# Patient Record
Sex: MALE | Race: WHITE | NOT HISPANIC OR LATINO | Employment: OTHER | RURAL
[De-identification: names, ages, dates, MRNs, and addresses within clinical notes are randomized per-mention and may not be internally consistent; named-entity substitution may affect disease eponyms.]

---

## 2020-07-27 ENCOUNTER — HISTORICAL (OUTPATIENT)
Dept: ADMINISTRATIVE | Facility: HOSPITAL | Age: 45
End: 2020-07-27

## 2020-07-28 LAB — GOLDENROD IGE QN: <0 KU/L

## 2021-05-31 ENCOUNTER — HOSPITAL ENCOUNTER (EMERGENCY)
Facility: HOSPITAL | Age: 46
Discharge: HOME | End: 2021-05-31
Attending: EMERGENCY MEDICINE
Payer: MEDICARE

## 2021-05-31 VITALS
RESPIRATION RATE: 16 BRPM | BODY MASS INDEX: 44.1 KG/M2 | HEART RATE: 65 BPM | WEIGHT: 315 LBS | HEIGHT: 71 IN | TEMPERATURE: 98 F | SYSTOLIC BLOOD PRESSURE: 153 MMHG | DIASTOLIC BLOOD PRESSURE: 87 MMHG | OXYGEN SATURATION: 95 %

## 2021-05-31 DIAGNOSIS — R10.11 RIGHT UPPER QUADRANT ABDOMINAL PAIN: Primary | ICD-10-CM

## 2021-05-31 LAB
ALBUMIN SERPL BCP-MCNC: 3.8 G/DL (ref 3.5–5)
ALBUMIN/GLOB SERPL: 1.2 {RATIO}
ALP SERPL-CCNC: 74 U/L (ref 45–115)
ALT SERPL W P-5'-P-CCNC: 30 U/L (ref 16–61)
ANION GAP SERPL CALCULATED.3IONS-SCNC: 11 MMOL/L (ref 7–16)
AST SERPL W P-5'-P-CCNC: 22 U/L (ref 15–37)
BASOPHILS # BLD AUTO: 0.08 K/UL (ref 0–0.2)
BASOPHILS NFR BLD AUTO: 1.4 % (ref 0–1)
BILIRUB SERPL-MCNC: 0.3 MG/DL (ref 0–1.2)
BILIRUB UR QL STRIP: NEGATIVE
BUN SERPL-MCNC: 12 MG/DL (ref 7–18)
BUN/CREAT SERPL: 11 (ref 6–20)
CALCIUM SERPL-MCNC: 8.7 MG/DL (ref 8.5–10.1)
CHLORIDE SERPL-SCNC: 106 MMOL/L (ref 98–107)
CLARITY UR: CLEAR
CO2 SERPL-SCNC: 31 MMOL/L (ref 21–32)
COLOR UR: NORMAL
CREAT SERPL-MCNC: 1.07 MG/DL (ref 0.7–1.3)
DIFFERENTIAL METHOD BLD: ABNORMAL
EOSINOPHIL # BLD AUTO: 0.54 K/UL (ref 0–0.5)
EOSINOPHIL NFR BLD AUTO: 9.5 % (ref 1–4)
ERYTHROCYTE [DISTWIDTH] IN BLOOD BY AUTOMATED COUNT: 13.3 % (ref 11.5–14.5)
GLOBULIN SER-MCNC: 3.3 G/DL (ref 2–4)
GLUCOSE SERPL-MCNC: 97 MG/DL (ref 74–106)
GLUCOSE UR STRIP-MCNC: NEGATIVE MG/DL
HCT VFR BLD AUTO: 43.7 % (ref 40–54)
HGB BLD-MCNC: 14.6 G/DL (ref 13.5–18)
IMM GRANULOCYTES # BLD AUTO: 0.01 K/UL (ref 0–0.04)
IMM GRANULOCYTES NFR BLD: 0.2 % (ref 0–0.4)
KETONES UR STRIP-SCNC: NEGATIVE MG/DL
LEUKOCYTE ESTERASE UR QL STRIP: NEGATIVE
LIPASE SERPL-CCNC: 113 U/L (ref 73–393)
LYMPHOCYTES # BLD AUTO: 1.67 K/UL (ref 1–4.8)
LYMPHOCYTES NFR BLD AUTO: 29.4 % (ref 27–41)
MCH RBC QN AUTO: 29.1 PG (ref 27–31)
MCHC RBC AUTO-ENTMCNC: 33.4 G/DL (ref 32–36)
MCV RBC AUTO: 87.2 FL (ref 80–96)
MONOCYTES # BLD AUTO: 0.6 K/UL (ref 0–0.8)
MONOCYTES NFR BLD AUTO: 10.6 % (ref 2–6)
MPC BLD CALC-MCNC: 9.4 FL (ref 9.4–12.4)
NEUTROPHILS # BLD AUTO: 2.78 K/UL (ref 1.8–7.7)
NEUTROPHILS NFR BLD AUTO: 48.9 % (ref 53–65)
NITRITE UR QL STRIP: NEGATIVE
NRBC # BLD AUTO: 0 X10E3/UL
NRBC, AUTO (.00): 0 %
PH UR STRIP: 7.5 PH UNITS
PLATELET # BLD AUTO: 188 K/UL (ref 150–400)
POTASSIUM SERPL-SCNC: 4.9 MMOL/L (ref 3.5–5.1)
PROT SERPL-MCNC: 7.1 G/DL (ref 6.4–8.2)
PROT UR QL STRIP: NEGATIVE
RBC # BLD AUTO: 5.01 M/UL (ref 4.6–6.2)
RBC # UR STRIP: NEGATIVE /UL
SODIUM SERPL-SCNC: 143 MMOL/L (ref 136–145)
SP GR UR STRIP: 1.01
UROBILINOGEN UR STRIP-ACNC: 0.2 MG/DL
WBC # BLD AUTO: 5.68 K/UL (ref 4.5–11)

## 2021-05-31 PROCEDURE — 99284 PR EMERGENCY DEPT VISIT,LEVEL IV: ICD-10-PCS | Mod: ,,, | Performed by: EMERGENCY MEDICINE

## 2021-05-31 PROCEDURE — 80053 COMPREHEN METABOLIC PANEL: CPT | Performed by: EMERGENCY MEDICINE

## 2021-05-31 PROCEDURE — 85025 COMPLETE CBC W/AUTO DIFF WBC: CPT | Performed by: EMERGENCY MEDICINE

## 2021-05-31 PROCEDURE — 96375 TX/PRO/DX INJ NEW DRUG ADDON: CPT

## 2021-05-31 PROCEDURE — 36415 COLL VENOUS BLD VENIPUNCTURE: CPT | Performed by: EMERGENCY MEDICINE

## 2021-05-31 PROCEDURE — 83690 ASSAY OF LIPASE: CPT | Performed by: EMERGENCY MEDICINE

## 2021-05-31 PROCEDURE — 63600175 PHARM REV CODE 636 W HCPCS: Performed by: EMERGENCY MEDICINE

## 2021-05-31 PROCEDURE — 81003 URINALYSIS AUTO W/O SCOPE: CPT | Performed by: EMERGENCY MEDICINE

## 2021-05-31 PROCEDURE — 99284 EMERGENCY DEPT VISIT MOD MDM: CPT | Mod: ,,, | Performed by: EMERGENCY MEDICINE

## 2021-05-31 PROCEDURE — 96374 THER/PROPH/DIAG INJ IV PUSH: CPT

## 2021-05-31 PROCEDURE — 96361 HYDRATE IV INFUSION ADD-ON: CPT

## 2021-05-31 PROCEDURE — 99285 EMERGENCY DEPT VISIT HI MDM: CPT | Mod: 25

## 2021-05-31 PROCEDURE — 25000003 PHARM REV CODE 250: Performed by: EMERGENCY MEDICINE

## 2021-05-31 RX ORDER — ONDANSETRON 2 MG/ML
4 INJECTION INTRAMUSCULAR; INTRAVENOUS
Status: COMPLETED | OUTPATIENT
Start: 2021-05-31 | End: 2021-05-31

## 2021-05-31 RX ORDER — HYDROCODONE BITARTRATE AND ACETAMINOPHEN 10; 325 MG/1; MG/1
1 TABLET ORAL 3 TIMES DAILY
COMMUNITY

## 2021-05-31 RX ORDER — AMOXICILLIN AND CLAVULANATE POTASSIUM 875; 125 MG/1; MG/1
1 TABLET, FILM COATED ORAL 2 TIMES DAILY
Qty: 20 TABLET | Refills: 0 | Status: SHIPPED | OUTPATIENT
Start: 2021-05-31 | End: 2021-06-10

## 2021-05-31 RX ORDER — HYDROMORPHONE HYDROCHLORIDE 2 MG/ML
1 INJECTION, SOLUTION INTRAMUSCULAR; INTRAVENOUS; SUBCUTANEOUS
Status: COMPLETED | OUTPATIENT
Start: 2021-05-31 | End: 2021-05-31

## 2021-05-31 RX ADMIN — SODIUM CHLORIDE 500 ML: 9 INJECTION, SOLUTION INTRAVENOUS at 04:05

## 2021-05-31 RX ADMIN — HYDROMORPHONE HYDROCHLORIDE 1 MG: 2 INJECTION, SOLUTION INTRAMUSCULAR; INTRAVENOUS; SUBCUTANEOUS at 04:05

## 2021-05-31 RX ADMIN — ONDANSETRON 4 MG: 2 INJECTION INTRAMUSCULAR; INTRAVENOUS at 04:05

## 2021-07-28 ENCOUNTER — INFUSION (OUTPATIENT)
Dept: INFECTIOUS DISEASES | Facility: HOSPITAL | Age: 46
End: 2021-07-28
Payer: MEDICARE

## 2021-07-28 VITALS
SYSTOLIC BLOOD PRESSURE: 143 MMHG | RESPIRATION RATE: 16 BRPM | HEART RATE: 60 BPM | TEMPERATURE: 98 F | DIASTOLIC BLOOD PRESSURE: 86 MMHG | OXYGEN SATURATION: 98 %

## 2021-07-28 DIAGNOSIS — U07.1 COVID-19: Primary | ICD-10-CM

## 2021-07-28 PROCEDURE — 25000003 PHARM REV CODE 250: Performed by: NURSE PRACTITIONER

## 2021-07-28 PROCEDURE — M0243 CASIRIVI AND IMDEVI INFUSION: HCPCS | Performed by: NURSE PRACTITIONER

## 2021-07-28 PROCEDURE — 63600175 PHARM REV CODE 636 W HCPCS: Performed by: NURSE PRACTITIONER

## 2021-07-28 RX ORDER — ONDANSETRON 4 MG/1
4 TABLET, ORALLY DISINTEGRATING ORAL ONCE AS NEEDED
Status: DISCONTINUED | OUTPATIENT
Start: 2021-07-28 | End: 2023-03-07 | Stop reason: CLARIF

## 2021-07-28 RX ORDER — DIPHENHYDRAMINE HYDROCHLORIDE 50 MG/ML
25 INJECTION INTRAMUSCULAR; INTRAVENOUS ONCE AS NEEDED
Status: DISCONTINUED | OUTPATIENT
Start: 2021-07-28 | End: 2023-03-07 | Stop reason: CLARIF

## 2021-07-28 RX ORDER — EPINEPHRINE 0.3 MG/.3ML
0.3 INJECTION SUBCUTANEOUS
Status: DISCONTINUED | OUTPATIENT
Start: 2021-07-28 | End: 2023-03-07 | Stop reason: CLARIF

## 2021-07-28 RX ORDER — ALBUTEROL SULFATE 90 UG/1
2 AEROSOL, METERED RESPIRATORY (INHALATION)
Status: DISCONTINUED | OUTPATIENT
Start: 2021-07-28 | End: 2023-03-07

## 2021-07-28 RX ORDER — SODIUM CHLORIDE 0.9 % (FLUSH) 0.9 %
10 SYRINGE (ML) INJECTION
Status: DISCONTINUED | OUTPATIENT
Start: 2021-07-28 | End: 2023-03-07 | Stop reason: CLARIF

## 2021-07-28 RX ORDER — ACETAMINOPHEN 325 MG/1
650 TABLET ORAL ONCE AS NEEDED
Status: DISCONTINUED | OUTPATIENT
Start: 2021-07-28 | End: 2023-03-07 | Stop reason: CLARIF

## 2021-07-28 RX ADMIN — CASIRIVIMAB AND IMDEVIMAB 600 MG: 600; 600 INJECTION, SOLUTION, CONCENTRATE INTRAVENOUS at 10:07

## 2021-07-29 ENCOUNTER — TELEPHONE (OUTPATIENT)
Dept: EMERGENCY MEDICINE | Facility: HOSPITAL | Age: 46
End: 2021-07-29

## 2022-07-08 ENCOUNTER — HOSPITAL ENCOUNTER (EMERGENCY)
Facility: HOSPITAL | Age: 47
Discharge: HOME | End: 2022-07-08
Payer: MEDICARE

## 2022-07-08 VITALS
SYSTOLIC BLOOD PRESSURE: 129 MMHG | WEIGHT: 315 LBS | OXYGEN SATURATION: 99 % | TEMPERATURE: 98 F | DIASTOLIC BLOOD PRESSURE: 69 MMHG | HEIGHT: 71 IN | RESPIRATION RATE: 20 BRPM | HEART RATE: 79 BPM | BODY MASS INDEX: 44.1 KG/M2

## 2022-07-08 DIAGNOSIS — J32.9 SINUSITIS, UNSPECIFIED CHRONICITY, UNSPECIFIED LOCATION: Primary | ICD-10-CM

## 2022-07-08 DIAGNOSIS — R07.9 CHEST PAIN: ICD-10-CM

## 2022-07-08 DIAGNOSIS — R10.9 ABDOMINAL PAIN: ICD-10-CM

## 2022-07-08 DIAGNOSIS — R10.9 ABDOMINAL PAIN, UNSPECIFIED ABDOMINAL LOCATION: ICD-10-CM

## 2022-07-08 LAB
ANION GAP SERPL CALCULATED.3IONS-SCNC: 11 MMOL/L (ref 7–16)
BASOPHILS # BLD AUTO: 0.08 K/UL (ref 0–0.2)
BASOPHILS NFR BLD AUTO: 0.8 % (ref 0–1)
BUN SERPL-MCNC: 19 MG/DL (ref 7–18)
BUN/CREAT SERPL: 16 (ref 6–20)
CALCIUM SERPL-MCNC: 8.7 MG/DL (ref 8.5–10.1)
CHLORIDE SERPL-SCNC: 104 MMOL/L (ref 98–107)
CK MB SERPL-MCNC: 1.4 NG/ML (ref 1–3.6)
CO2 SERPL-SCNC: 26 MMOL/L (ref 21–32)
CREAT SERPL-MCNC: 1.22 MG/DL (ref 0.7–1.3)
DIFFERENTIAL METHOD BLD: ABNORMAL
EOSINOPHIL # BLD AUTO: 0.18 K/UL (ref 0–0.5)
EOSINOPHIL NFR BLD AUTO: 1.9 % (ref 1–4)
ERYTHROCYTE [DISTWIDTH] IN BLOOD BY AUTOMATED COUNT: 13.4 % (ref 11.5–14.5)
GLUCOSE SERPL-MCNC: 88 MG/DL (ref 74–106)
HCT VFR BLD AUTO: 44.1 % (ref 40–54)
HGB BLD-MCNC: 15.1 G/DL (ref 13.5–18)
IMM GRANULOCYTES # BLD AUTO: 0.02 K/UL (ref 0–0.04)
IMM GRANULOCYTES NFR BLD: 0.2 % (ref 0–0.4)
LYMPHOCYTES # BLD AUTO: 1.63 K/UL (ref 1–4.8)
LYMPHOCYTES NFR BLD AUTO: 17 % (ref 27–41)
MAGNESIUM SERPL-MCNC: 2.2 MG/DL (ref 1.7–2.3)
MCH RBC QN AUTO: 29 PG (ref 27–31)
MCHC RBC AUTO-ENTMCNC: 34.2 G/DL (ref 32–36)
MCV RBC AUTO: 84.8 FL (ref 80–96)
MONOCYTES # BLD AUTO: 0.77 K/UL (ref 0–0.8)
MONOCYTES NFR BLD AUTO: 8 % (ref 2–6)
MPC BLD CALC-MCNC: 9.8 FL (ref 9.4–12.4)
MYOGLOBIN SERPL-MCNC: 51 NG/ML (ref 16–116)
NEUTROPHILS # BLD AUTO: 6.93 K/UL (ref 1.8–7.7)
NEUTROPHILS NFR BLD AUTO: 72.1 % (ref 53–65)
NRBC # BLD AUTO: 0 X10E3/UL
NRBC, AUTO (.00): 0 %
PLATELET # BLD AUTO: 217 K/UL (ref 150–400)
POTASSIUM SERPL-SCNC: 4.4 MMOL/L (ref 3.5–5.1)
RBC # BLD AUTO: 5.2 M/UL (ref 4.6–6.2)
SODIUM SERPL-SCNC: 137 MMOL/L (ref 136–145)
TROPONIN I SERPL HS-MCNC: 26.1 PG/ML
WBC # BLD AUTO: 9.61 K/UL (ref 4.5–11)

## 2022-07-08 PROCEDURE — 99285 EMERGENCY DEPT VISIT HI MDM: CPT | Mod: 25

## 2022-07-08 PROCEDURE — 93010 ELECTROCARDIOGRAM REPORT: CPT | Mod: ,,, | Performed by: STUDENT IN AN ORGANIZED HEALTH CARE EDUCATION/TRAINING PROGRAM

## 2022-07-08 PROCEDURE — 93005 ELECTROCARDIOGRAM TRACING: CPT

## 2022-07-08 PROCEDURE — 99284 PR EMERGENCY DEPT VISIT,LEVEL IV: ICD-10-PCS | Mod: ,,, | Performed by: NURSE PRACTITIONER

## 2022-07-08 PROCEDURE — 83735 ASSAY OF MAGNESIUM: CPT | Performed by: NURSE PRACTITIONER

## 2022-07-08 PROCEDURE — 93010 EKG 12-LEAD: ICD-10-PCS | Mod: ,,, | Performed by: STUDENT IN AN ORGANIZED HEALTH CARE EDUCATION/TRAINING PROGRAM

## 2022-07-08 PROCEDURE — 82553 CREATINE MB FRACTION: CPT | Performed by: NURSE PRACTITIONER

## 2022-07-08 PROCEDURE — 99284 EMERGENCY DEPT VISIT MOD MDM: CPT | Mod: ,,, | Performed by: NURSE PRACTITIONER

## 2022-07-08 PROCEDURE — 84484 ASSAY OF TROPONIN QUANT: CPT | Performed by: NURSE PRACTITIONER

## 2022-07-08 PROCEDURE — 85025 COMPLETE CBC W/AUTO DIFF WBC: CPT | Performed by: NURSE PRACTITIONER

## 2022-07-08 PROCEDURE — 83874 ASSAY OF MYOGLOBIN: CPT | Performed by: NURSE PRACTITIONER

## 2022-07-08 PROCEDURE — 80048 BASIC METABOLIC PNL TOTAL CA: CPT | Performed by: NURSE PRACTITIONER

## 2022-07-08 PROCEDURE — 36415 COLL VENOUS BLD VENIPUNCTURE: CPT | Performed by: NURSE PRACTITIONER

## 2022-07-08 RX ORDER — AMOXICILLIN AND CLAVULANATE POTASSIUM 875; 125 MG/1; MG/1
1 TABLET, FILM COATED ORAL EVERY 12 HOURS
Qty: 20 TABLET | Refills: 0 | Status: SHIPPED | OUTPATIENT
Start: 2022-07-08 | End: 2022-07-18

## 2022-07-08 NOTE — ED PROVIDER NOTES
Encounter Date: 7/8/2022       History     Chief Complaint   Patient presents with    Abdominal Pain     Pt presents with increased blurred vision and epigastric pain that started around 1pm today. Pt states he has seen an eye doctor and PCP for almost a year for intermittent blurred vision but it worsened today. Pt states he got dizzy and increased blurred vision and stumbled around 1pm today. Pt ambulated to the room without problems and appears in no distress at this time. He states his vision is normal now and mild epigastric pain. Pt denies fever, cough, nausea, palpitations.         Review of patient's allergies indicates:   Allergen Reactions    Opioids - morphine analogues Hives and Rash     Past Medical History:   Diagnosis Date    Asthma     Diabetes mellitus, type 2     Hypertension      No past surgical history on file.  No family history on file.  Social History     Tobacco Use    Smoking status: Never Smoker    Smokeless tobacco: Never Used   Substance Use Topics    Alcohol use: Not Currently    Drug use: Never     Review of Systems   Constitutional: Negative for fever.   HENT: Negative for sore throat.    Eyes: Positive for visual disturbance.   Respiratory: Negative for shortness of breath.    Cardiovascular: Negative for chest pain.   Gastrointestinal: Positive for abdominal pain. Negative for nausea.   Genitourinary: Negative for dysuria.   Musculoskeletal: Negative for back pain.   Skin: Negative for rash.   Neurological: Negative for dizziness and weakness.   Hematological: Does not bruise/bleed easily.       Physical Exam     Initial Vitals [07/08/22 1511]   BP Pulse Resp Temp SpO2   132/71 78 20 97.8 °F (36.6 °C) 96 %      MAP       --         Physical Exam    Nursing note and vitals reviewed.  Constitutional: He appears well-developed.   Cardiovascular: Normal rate and regular rhythm.   Pulmonary/Chest: Breath sounds normal.   Abdominal: Abdomen is soft. Bowel sounds are normal.    Musculoskeletal:         General: Normal range of motion.     Neurological: He is alert and oriented to person, place, and time. He has normal strength. No cranial nerve deficit or sensory deficit.   Psychiatric: He has a normal mood and affect. Thought content normal.         Medical Screening Exam   See Full Note    ED Course   Procedures  Labs Reviewed   BASIC METABOLIC PANEL - Abnormal; Notable for the following components:       Result Value    BUN 19 (*)     All other components within normal limits   CBC WITH DIFFERENTIAL - Abnormal; Notable for the following components:    Neutrophils % 72.1 (*)     Lymphocytes % 17.0 (*)     Monocytes % 8.0 (*)     All other components within normal limits   MAGNESIUM - Normal   TROPONIN I - Normal   CK-MB - Normal   MYOGLOBIN, SERUM - Normal   CBC W/ AUTO DIFFERENTIAL    Narrative:     The following orders were created for panel order CBC auto differential.  Procedure                               Abnormality         Status                     ---------                               -----------         ------                     CBC with Differential[[id number]]        Abnormal            Final result                 Please view results for these tests on the individual orders.   EXTRA TUBES    Narrative:     The following orders were created for panel order EXTRA TUBES.  Procedure                               Abnormality         Status                     ---------                               -----------         ------                     Light Blue Top Hold[[id number]]                              In process                   Please view results for these tests on the individual orders.   LIGHT BLUE TOP HOLD        ECG Results          EKG 12-lead (Final result)  Result time 07/08/22 16:20:27    Final result by Interface, Lab In [facility] (07/08/22 16:20:27)                 Narrative:    Test Reason : R07.9,    Vent. Rate : 075 BPM     Atrial Rate : 075 BPM     P-R Int  : 150 ms          QRS Dur : 092 ms      QT Int : 390 ms       P-R-T Axes : 048 -14 040 degrees     QTc Int : 435 ms    Normal sinus rhythm  Normal ECG  No previous ECGs available  Confirmed by Anel KOO, Gilbert DORSEY ([id number]) on 7/8/2022 4:20:20 PM    Referred By: SURINDER   SELF           Confirmed By:Gilbert Tam MD                            Imaging Results          X-Ray Chest PA And Lateral (Final result)  Result time 07/08/22 16:45:51    Final result by Edy Ayers DO (07/08/22 16:45:51)                 Impression:      No acute cardiopulmonary process demonstrated.    Point of Service: [facility]      Electronically signed by: Edy Ayers  Date:    07/08/2022  Time:    16:45             Narrative:    EXAMINATION:  XR CHEST PA AND LATERAL    CLINICAL HISTORY:  Chest pain, unspecified    COMPARISON:  Chest x-ray April 18, 2018    TECHNIQUE:  Frontal and lateral views of the chest.    FINDINGS:  The cardiomediastinal silhouette is stable in configuration.  Chronic change of the lungs without focal consolidation, pleural effusion, or pneumothorax.  Visualized osseous and surrounding soft tissue structures appear grossly unchanged.  Partially visualized lower cervical fusion hardware.                               CT Head Without Contrast (Final result)  Result time 07/08/22 16:39:56    Final result by Edy Ayers DO (07/08/22 16:39:56)                 Impression:      No convincing imaging evidence of acute intracranial abnormality.  Left ethmoid sinus disease.    The CT exam was performed using one or more of the following dose    reduction techniques- Automated exposure control, adjustment of the mA    and/or kV according to patient size, and/or use of iterative    reconstructed technique.    Point of Service: [facility]      Electronically signed by: Edy Ayers  Date:    07/08/2022  Time:    16:39             Narrative:    EXAMINATION:  CT HEAD WITHOUT  CONTRAST    CLINICAL HISTORY:  blurred vision, dizziness;    COMPARISON:  None    TECHNIQUE:  Multiple axial tomographic images of the brain were obtained without the use of intravenous contrast.    FINDINGS:  Midline structures are nondisplaced.  No convincing evidence of acute intracranial hemorrhage.  No convincing evidence of hydrocephalus.  Opacification of a few left posterior ethmoid air cells.                                 Medications - No data to display                    Clinical Impression:   Final diagnoses:  [R07.9] Chest pain  [R10.9] Abdominal pain  [J32.9] Sinusitis, unspecified chronicity, unspecified location (Primary)          ED Disposition Condition    Discharge Stable        ED Prescriptions     Medication Sig Dispense Start Date End Date Auth. Provider    amoxicillin-clavulanate 875-125mg (AUGMENTIN) 875-125 mg per tablet Take 1 tablet by mouth every 12 (twelve) hours. for 10 days 20 tablet 7/8/2022 7/18/2022 SAMEER Mcnulty        Follow-up Information    None          SAMEER Mcnulty  07/08/22 0631

## 2022-07-08 NOTE — ED TRIAGE NOTES
PATIENT PRESENTS TO ER WITH COMPLAINT OF BLURRY VISION X 7-9 MONTHS. HAS BEEN EVALUATED AT HIS PCP FOR THIS AND NO RESULTS. ALSO REPORTS UPPER ABD AND EPIGASTRIC PAIN

## 2022-11-08 ENCOUNTER — HOSPITAL ENCOUNTER (EMERGENCY)
Facility: HOSPITAL | Age: 47
Discharge: HOME | End: 2022-11-08
Payer: MEDICARE

## 2022-11-08 VITALS
RESPIRATION RATE: 18 BRPM | BODY MASS INDEX: 44.1 KG/M2 | OXYGEN SATURATION: 99 % | TEMPERATURE: 98 F | HEART RATE: 64 BPM | WEIGHT: 315 LBS | DIASTOLIC BLOOD PRESSURE: 97 MMHG | SYSTOLIC BLOOD PRESSURE: 159 MMHG | HEIGHT: 71 IN

## 2022-11-08 DIAGNOSIS — R11.0 NAUSEA: ICD-10-CM

## 2022-11-08 DIAGNOSIS — R10.9 ABDOMINAL PAIN, UNSPECIFIED ABDOMINAL LOCATION: Primary | ICD-10-CM

## 2022-11-08 LAB
ALBUMIN SERPL BCP-MCNC: 3.5 G/DL (ref 3.5–5)
ALBUMIN/GLOB SERPL: 1.4 {RATIO}
ALP SERPL-CCNC: 60 U/L (ref 45–115)
ALT SERPL W P-5'-P-CCNC: 27 U/L (ref 16–61)
ANION GAP SERPL CALCULATED.3IONS-SCNC: 10 MMOL/L (ref 7–16)
AST SERPL W P-5'-P-CCNC: 22 U/L (ref 15–37)
BASOPHILS # BLD AUTO: 0.07 K/UL (ref 0–0.2)
BASOPHILS NFR BLD AUTO: 1.2 % (ref 0–1)
BILIRUB SERPL-MCNC: 0.4 MG/DL (ref ?–1.2)
BUN SERPL-MCNC: 11 MG/DL (ref 7–18)
BUN/CREAT SERPL: 10 (ref 6–20)
CALCIUM SERPL-MCNC: 8.3 MG/DL (ref 8.5–10.1)
CHLORIDE SERPL-SCNC: 106 MMOL/L (ref 98–107)
CO2 SERPL-SCNC: 29 MMOL/L (ref 21–32)
CREAT SERPL-MCNC: 1.06 MG/DL (ref 0.7–1.3)
DIFFERENTIAL METHOD BLD: ABNORMAL
EGFR (NO RACE VARIABLE) (RUSH/TITUS): 87 ML/MIN/1.73M²
EOSINOPHIL # BLD AUTO: 0.38 K/UL (ref 0–0.5)
EOSINOPHIL NFR BLD AUTO: 6.4 % (ref 1–4)
ERYTHROCYTE [DISTWIDTH] IN BLOOD BY AUTOMATED COUNT: 13.3 % (ref 11.5–14.5)
GLOBULIN SER-MCNC: 2.5 G/DL (ref 2–4)
GLUCOSE SERPL-MCNC: 116 MG/DL (ref 70–105)
GLUCOSE SERPL-MCNC: 93 MG/DL (ref 74–106)
HCT VFR BLD AUTO: 43.1 % (ref 40–54)
HGB BLD-MCNC: 14.2 G/DL (ref 13.5–18)
IMM GRANULOCYTES # BLD AUTO: 0.01 K/UL (ref 0–0.04)
IMM GRANULOCYTES NFR BLD: 0.2 % (ref 0–0.4)
LIPASE SERPL-CCNC: 71 U/L (ref 73–393)
LYMPHOCYTES # BLD AUTO: 1.68 K/UL (ref 1–4.8)
LYMPHOCYTES NFR BLD AUTO: 28.5 % (ref 27–41)
MCH RBC QN AUTO: 28.7 PG (ref 27–31)
MCHC RBC AUTO-ENTMCNC: 32.9 G/DL (ref 32–36)
MCV RBC AUTO: 87.2 FL (ref 80–96)
MONOCYTES # BLD AUTO: 0.57 K/UL (ref 0–0.8)
MONOCYTES NFR BLD AUTO: 9.7 % (ref 2–6)
MPC BLD CALC-MCNC: 9.3 FL (ref 9.4–12.4)
NEUTROPHILS # BLD AUTO: 3.19 K/UL (ref 1.8–7.7)
NEUTROPHILS NFR BLD AUTO: 54 % (ref 53–65)
NRBC # BLD AUTO: 0 X10E3/UL
NRBC, AUTO (.00): 0 %
PLATELET # BLD AUTO: 198 K/UL (ref 150–400)
POTASSIUM SERPL-SCNC: 4.5 MMOL/L (ref 3.5–5.1)
PROT SERPL-MCNC: 6 G/DL (ref 6.4–8.2)
RBC # BLD AUTO: 4.94 M/UL (ref 4.6–6.2)
SODIUM SERPL-SCNC: 140 MMOL/L (ref 136–145)
WBC # BLD AUTO: 5.9 K/UL (ref 4.5–11)

## 2022-11-08 PROCEDURE — 80053 COMPREHEN METABOLIC PANEL: CPT | Performed by: NURSE PRACTITIONER

## 2022-11-08 PROCEDURE — 83690 ASSAY OF LIPASE: CPT | Performed by: NURSE PRACTITIONER

## 2022-11-08 PROCEDURE — 99283 EMERGENCY DEPT VISIT LOW MDM: CPT | Mod: ,,, | Performed by: NURSE PRACTITIONER

## 2022-11-08 PROCEDURE — 36415 COLL VENOUS BLD VENIPUNCTURE: CPT | Performed by: NURSE PRACTITIONER

## 2022-11-08 PROCEDURE — 85025 COMPLETE CBC W/AUTO DIFF WBC: CPT | Performed by: NURSE PRACTITIONER

## 2022-11-08 PROCEDURE — 82962 GLUCOSE BLOOD TEST: CPT

## 2022-11-08 PROCEDURE — 25500020 PHARM REV CODE 255: Performed by: NURSE PRACTITIONER

## 2022-11-08 PROCEDURE — 99283 PR EMERGENCY DEPT VISIT,LEVEL III: ICD-10-PCS | Mod: ,,, | Performed by: NURSE PRACTITIONER

## 2022-11-08 PROCEDURE — 99285 EMERGENCY DEPT VISIT HI MDM: CPT | Mod: 25

## 2022-11-08 RX ORDER — ONDANSETRON 4 MG/1
4 TABLET, ORALLY DISINTEGRATING ORAL EVERY 8 HOURS PRN
Qty: 10 TABLET | Refills: 0 | Status: SHIPPED | OUTPATIENT
Start: 2022-11-08 | End: 2023-06-21 | Stop reason: ALTCHOICE

## 2022-11-08 RX ADMIN — IOPAMIDOL 100 ML: 755 INJECTION, SOLUTION INTRAVENOUS at 01:11

## 2022-11-08 NOTE — ED TRIAGE NOTES
Pt presents to the ED with c/o abdominal pain that started Friday. States hx of 5-7 ulcers in the past that were treated with abx and resolved. C/o nausea

## 2022-11-08 NOTE — ED PROVIDER NOTES
Encounter Date: 11/8/2022       History     Chief Complaint   Patient presents with    Abdominal Pain     47 year old male presents to the emergency department to be evaluated for pain in his right upper abdomen. His pain began 4 days ago. He has had some nausea. Denies any vomiting, diarrhea, constipation.     The history is provided by the patient.   Abdominal Pain  The current episode started several days ago. The abdominal pain radiates to the RUQ. The other symptoms of the illness include nausea. The other symptoms of the illness do not include fever, fatigue, jaundice, melena, vomiting, diarrhea, dysuria, hematemesis or hematochezia.   Review of patient's allergies indicates:   Allergen Reactions    Opioids - morphine analogues Hives and Rash     Past Medical History:   Diagnosis Date    Asthma     Diabetes mellitus, type 2     Hypertension      No past surgical history on file.  No family history on file.  Social History     Tobacco Use    Smoking status: Never    Smokeless tobacco: Never   Substance Use Topics    Alcohol use: Not Currently    Drug use: Never     Review of Systems   Constitutional:  Negative for fatigue and fever.   Gastrointestinal:  Positive for abdominal pain and nausea. Negative for diarrhea, hematemesis, hematochezia, jaundice, melena and vomiting.   Genitourinary:  Negative for dysuria.   All other systems reviewed and are negative.    Physical Exam     Initial Vitals   BP Pulse Resp Temp SpO2   11/08/22 0938 11/08/22 0938 11/08/22 0938 11/08/22 0938 11/08/22 1000   135/76 62 15 98.4 °F (36.9 °C) 96 %      MAP       --                Physical Exam    Vitals reviewed.  Constitutional: He appears well-developed and well-nourished.   Cardiovascular:  Normal rate and regular rhythm.           Pulmonary/Chest: Breath sounds normal.   Abdominal: Abdomen is soft. Bowel sounds are normal. He exhibits no distension and no mass. There is abdominal tenderness (moderate tenderness right upper  abdomen). There is no rebound and no guarding.   Musculoskeletal:         General: Normal range of motion.     Neurological: He is alert and oriented to person, place, and time. He has normal strength. GCS score is 15. GCS eye subscore is 4. GCS verbal subscore is 5. GCS motor subscore is 6.   Skin: Skin is warm and dry. Capillary refill takes less than 2 seconds.   Psychiatric: He has a normal mood and affect.       Medical Screening Exam   See Full Note    ED Course   Procedures  Labs Reviewed   COMPREHENSIVE METABOLIC PANEL - Abnormal; Notable for the following components:       Result Value    Calcium 8.3 (*)     Total Protein 6.0 (*)     All other components within normal limits   LIPASE - Abnormal; Notable for the following components:    Lipase 71 (*)     All other components within normal limits   CBC WITH DIFFERENTIAL - Abnormal; Notable for the following components:    MPV 9.3 (*)     Monocytes % 9.7 (*)     Eosinophils % 6.4 (*)     Basophils % 1.2 (*)     All other components within normal limits   POCT GLUCOSE MONITORING CONTINUOUS - Abnormal; Notable for the following components:    POC Glucose 116 (*)     All other components within normal limits   CBC W/ AUTO DIFFERENTIAL    Narrative:     The following orders were created for panel order CBC auto differential.  Procedure                               Abnormality         Status                     ---------                               -----------         ------                     CBC with Differential[[id number]]        Abnormal            Final result                 Please view results for these tests on the individual orders.          Imaging Results              CT Abdomen Pelvis With Contrast (Final result)  Result time 11/08/22 13:35:32      Final result by Leroy Cain II, MD (11/08/22 13:35:32)                   Impression:      No evidence of acute process or other significant abnormality demonstrated.      Electronically signed  by: Leroy Cain  Date:    11/08/2022  Time:    13:35               Narrative:    EXAMINATION:  CT ABDOMEN PELVIS WITH CONTRAST    CLINICAL HISTORY:  right upper abdominal pain;    TECHNIQUE:  Axial CT imaging of the abdomen and pelvis is performed with intravenous contrast. Contrast dose is 100 cc Isovue 370.    CT dose reduction technique used - Dose Rite and tube current modulation.    COMPARISON:  16 September 2016    FINDINGS:  Cardiac and lung bases are within normal limits    CT abdomen: The liver, spleen, pancreas and adrenal glands are normal in size and enhancement.  No evidence of focal lesion is demonstrated in these solid organs.    Kidneys are normal in size and enhancement.  No evidence of hydronephrosis or nephrolithiasis is seen.    The bowel caliber is normal and no wall thickening or adjacent inflammatory change is seen.  No evidence of free fluid or free air is present.  Appendix appears normal.    CT pelvis: The pelvic bowel appears within normal limits.  Bladder shows no evidence of abnormality.  The pelvic organs show no evidence of abnormality.                                       US Abdomen Limited_Gallbladder (Final result)  Result time 11/08/22 12:28:59      Final result by Leroy Cain II, MD (11/08/22 12:28:59)                   Impression:      Fatty liver infiltration and mild hepatomegaly.  No other evidence of abnormality demonstrated.    Ultrasound images stored and captured.      Electronically signed by: Leroy Cain  Date:    11/08/2022  Time:    12:28               Narrative:    EXAMINATION:  US ABDOMEN LIMITED_GALLBLADDER    CLINICAL HISTORY:  right upper abdominal pain;    TECHNIQUE:  Grayscale and color Doppler imaging performed.    COMPARISON:  None.    FINDINGS:  The liver is increased in size and echogenicity .  The gallbladder is fluid-filled without evidence of stones or sludge. The gallbladder wall thickness is 2.5 mm . The common bile duct measures 4.0  mm.    The visualized portion of the pancreas appear within normal limits    The right kidney is normal in size and echogenicity and measures 11.7 cm .    No free fluid or free air seen.                                       XR ABDOMEN, ACUTE 2 OR MORE VIEWS WITH CHEST (Final result)  Result time 11/08/22 10:48:35      Final result by Leroy Cain II, MD (11/08/22 10:48:35)                   Impression:      No evidence of abnormality demonstrated      Electronically signed by: Leroy Cain  Date:    11/08/2022  Time:    10:48               Narrative:    EXAMINATION:  XR ABDOMEN ACUTE 2 OR MORE VIEWS WITH CHEST    CLINICAL HISTORY:  Abdominal pain right upper abdominal pain;    COMPARISON:  28 September 2017    FINDINGS:  No free fluid or free air seen.  The bowel gas pattern appears within normal limits.  No abnormal calcifications are present. Chest shows no evidence of abnormality. No other abnormality is identified.                                       Medications   iopamidoL (ISOVUE-370) injection 100 mL (100 mLs Intravenous Given 11/8/22 1310)                       Clinical Impression:   Final diagnoses:  [R10.9] Abdominal pain, unspecified abdominal location (Primary)  [R11.0] Nausea      ED Disposition Condition    Discharge Stable          ED Prescriptions       Medication Sig Dispense Start Date End Date Auth. Provider    ondansetron (ZOFRAN-ODT) 4 MG TbDL Take 1 tablet (4 mg total) by mouth every 8 (eight) hours as needed. 10 tablet 11/8/2022 -- SAMEER Deal          Follow-up Information    None          SAMEER Deal  11/08/22 3667

## 2022-11-09 ENCOUNTER — TELEPHONE (OUTPATIENT)
Dept: EMERGENCY MEDICINE | Facility: HOSPITAL | Age: 47
End: 2022-11-09
Payer: MEDICARE

## 2023-03-07 ENCOUNTER — HOSPITAL ENCOUNTER (EMERGENCY)
Facility: HOSPITAL | Age: 48
Discharge: HOME | End: 2023-03-07
Payer: MEDICARE

## 2023-03-07 VITALS
OXYGEN SATURATION: 95 % | RESPIRATION RATE: 14 BRPM | WEIGHT: 315 LBS | HEIGHT: 71 IN | BODY MASS INDEX: 44.1 KG/M2 | HEART RATE: 53 BPM | DIASTOLIC BLOOD PRESSURE: 76 MMHG | SYSTOLIC BLOOD PRESSURE: 148 MMHG | TEMPERATURE: 99 F

## 2023-03-07 DIAGNOSIS — J10.1 INFLUENZA A: Primary | ICD-10-CM

## 2023-03-07 DIAGNOSIS — R06.02 SHORTNESS OF BREATH: ICD-10-CM

## 2023-03-07 DIAGNOSIS — J40 BRONCHITIS: ICD-10-CM

## 2023-03-07 LAB
ALBUMIN SERPL BCP-MCNC: 3.5 G/DL (ref 3.5–5)
ALBUMIN/GLOB SERPL: 1 {RATIO}
ALP SERPL-CCNC: 73 U/L (ref 45–115)
ALT SERPL W P-5'-P-CCNC: 34 U/L (ref 16–61)
ANION GAP SERPL CALCULATED.3IONS-SCNC: 9 MMOL/L (ref 7–16)
AST SERPL W P-5'-P-CCNC: 23 U/L (ref 15–37)
BASOPHILS # BLD AUTO: 0.02 K/UL (ref 0–0.2)
BASOPHILS NFR BLD AUTO: 0.3 % (ref 0–1)
BILIRUB SERPL-MCNC: 0.2 MG/DL (ref ?–1.2)
BUN SERPL-MCNC: 12 MG/DL (ref 7–18)
BUN/CREAT SERPL: 12 (ref 6–20)
CALCIUM SERPL-MCNC: 8.6 MG/DL (ref 8.5–10.1)
CHLORIDE SERPL-SCNC: 104 MMOL/L (ref 98–107)
CO2 SERPL-SCNC: 31 MMOL/L (ref 21–32)
CREAT SERPL-MCNC: 1.02 MG/DL (ref 0.7–1.3)
DIFFERENTIAL METHOD BLD: ABNORMAL
EGFR (NO RACE VARIABLE) (RUSH/TITUS): 91 ML/MIN/1.73M²
EOSINOPHIL # BLD AUTO: 0 K/UL (ref 0–0.5)
EOSINOPHIL NFR BLD AUTO: 0 % (ref 1–4)
ERYTHROCYTE [DISTWIDTH] IN BLOOD BY AUTOMATED COUNT: 14.8 % (ref 11.5–14.5)
FLUAV AG UPPER RESP QL IA.RAPID: POSITIVE
FLUBV AG UPPER RESP QL IA.RAPID: NEGATIVE
GLOBULIN SER-MCNC: 3.6 G/DL (ref 2–4)
GLUCOSE SERPL-MCNC: 111 MG/DL (ref 74–106)
HCT VFR BLD AUTO: 39.9 % (ref 40–54)
HGB BLD-MCNC: 13.5 G/DL (ref 13.5–18)
LYMPHOCYTES # BLD AUTO: 0.8 K/UL (ref 1–4.8)
LYMPHOCYTES NFR BLD AUTO: 11.4 % (ref 27–41)
MCH RBC QN AUTO: 29.6 PG (ref 27–31)
MCHC RBC AUTO-ENTMCNC: 33.8 G/DL (ref 32–36)
MCV RBC AUTO: 87.5 FL (ref 80–96)
MONOCYTES # BLD AUTO: 0.75 K/UL (ref 0–0.8)
MONOCYTES NFR BLD AUTO: 10.7 % (ref 2–6)
MPC BLD CALC-MCNC: 9.8 FL (ref 9.4–12.4)
NEUTROPHILS # BLD AUTO: 5.43 K/UL (ref 1.8–7.7)
NEUTROPHILS NFR BLD AUTO: 77.6 % (ref 53–65)
NT-PROBNP SERPL-MCNC: 460 PG/ML (ref 1–125)
PLATELET # BLD AUTO: 185 K/UL (ref 150–400)
POTASSIUM SERPL-SCNC: 4.4 MMOL/L (ref 3.5–5.1)
PROT SERPL-MCNC: 7.1 G/DL (ref 6.4–8.2)
RBC # BLD AUTO: 4.56 M/UL (ref 4.6–6.2)
SARS-COV+SARS-COV-2 AG RESP QL IA.RAPID: NEGATIVE
SODIUM SERPL-SCNC: 140 MMOL/L (ref 136–145)
TROPONIN I SERPL HS-MCNC: 9.3 PG/ML
WBC # BLD AUTO: 7 K/UL (ref 4.5–11)

## 2023-03-07 PROCEDURE — 99284 PR EMERGENCY DEPT VISIT,LEVEL IV: ICD-10-PCS | Mod: EDII,,, | Performed by: NURSE PRACTITIONER

## 2023-03-07 PROCEDURE — 87428 SARSCOV & INF VIR A&B AG IA: CPT | Performed by: NURSE PRACTITIONER

## 2023-03-07 PROCEDURE — 93005 ELECTROCARDIOGRAM TRACING: CPT

## 2023-03-07 PROCEDURE — 99284 EMERGENCY DEPT VISIT MOD MDM: CPT | Mod: EDII,,, | Performed by: NURSE PRACTITIONER

## 2023-03-07 PROCEDURE — 99285 EMERGENCY DEPT VISIT HI MDM: CPT | Mod: 25

## 2023-03-07 PROCEDURE — 80053 COMPREHEN METABOLIC PANEL: CPT | Performed by: NURSE PRACTITIONER

## 2023-03-07 PROCEDURE — 99284 EMERGENCY DEPT VISIT MOD MDM: CPT | Mod: GF

## 2023-03-07 PROCEDURE — 96374 THER/PROPH/DIAG INJ IV PUSH: CPT

## 2023-03-07 PROCEDURE — 83880 ASSAY OF NATRIURETIC PEPTIDE: CPT | Performed by: NURSE PRACTITIONER

## 2023-03-07 PROCEDURE — 84484 ASSAY OF TROPONIN QUANT: CPT | Performed by: NURSE PRACTITIONER

## 2023-03-07 PROCEDURE — 63600175 PHARM REV CODE 636 W HCPCS: Performed by: NURSE PRACTITIONER

## 2023-03-07 PROCEDURE — 85025 COMPLETE CBC W/AUTO DIFF WBC: CPT | Performed by: NURSE PRACTITIONER

## 2023-03-07 RX ORDER — ALBUTEROL SULFATE 90 UG/1
1-2 AEROSOL, METERED RESPIRATORY (INHALATION) EVERY 4 HOURS PRN
Qty: 18 G | Refills: 0 | Status: SHIPPED | OUTPATIENT
Start: 2023-03-07 | End: 2023-09-25

## 2023-03-07 RX ORDER — GABAPENTIN 300 MG/1
300 CAPSULE ORAL NIGHTLY
COMMUNITY

## 2023-03-07 RX ORDER — AZITHROMYCIN 250 MG/1
250 TABLET, FILM COATED ORAL DAILY
Qty: 6 TABLET | Refills: 0 | Status: SHIPPED | OUTPATIENT
Start: 2023-03-07 | End: 2023-06-16 | Stop reason: CLARIF

## 2023-03-07 RX ORDER — PREDNISONE 20 MG/1
40 TABLET ORAL DAILY
Qty: 10 TABLET | Refills: 0 | Status: SHIPPED | OUTPATIENT
Start: 2023-03-07 | End: 2023-03-12

## 2023-03-07 RX ORDER — OMEPRAZOLE 40 MG/1
40 CAPSULE, DELAYED RELEASE ORAL
COMMUNITY
End: 2023-09-25

## 2023-03-07 RX ORDER — METHYLPREDNISOLONE SOD SUCC 125 MG
125 VIAL (EA) INJECTION
Status: COMPLETED | OUTPATIENT
Start: 2023-03-07 | End: 2023-03-07

## 2023-03-07 RX ADMIN — METHYLPREDNISOLONE SODIUM SUCCINATE 125 MG: 125 INJECTION, POWDER, FOR SOLUTION INTRAMUSCULAR; INTRAVENOUS at 10:03

## 2023-03-07 NOTE — ED PROVIDER NOTES
Encounter Date: 3/7/2023       History     Chief Complaint   Patient presents with    Shortness of Breath    Cough    Chest Pain    Sore Throat     Stated Saturday, spitting up some blood     Patient presents to the ED with complaints of shortness of breath that has been ongoing for the past 5 days. Reports he has noticed some fever with his cough and congestion. Patient reports there are other family members sick also. Patient states he tried to go to another ED and clinic but was unable to be seen. Patient reports he made an appointment with his PCP today but did not feel he could wait.     The history is provided by the patient.   Review of patient's allergies indicates:   Allergen Reactions    Opioids - morphine analogues Hives and Rash     Past Medical History:   Diagnosis Date    Asthma     Diabetes mellitus, type 2     Hypertension      History reviewed. No pertinent surgical history.  History reviewed. No pertinent family history.  Social History     Tobacco Use    Smoking status: Never    Smokeless tobacco: Never   Substance Use Topics    Alcohol use: Not Currently    Drug use: Never     Review of Systems   Constitutional:  Positive for fever.   HENT:  Positive for congestion.    Respiratory:  Positive for cough and shortness of breath.    Cardiovascular: Negative.    Musculoskeletal: Negative.    Skin: Negative.    Neurological: Negative.    Psychiatric/Behavioral: Negative.     All other systems reviewed and are negative.    Physical Exam     Initial Vitals   BP Pulse Resp Temp SpO2   03/07/23 0940 03/07/23 0944 03/07/23 0944 03/07/23 0947 03/07/23 0940   (!) 150/73 (!) 54 (!) 22 98.5 °F (36.9 °C) 97 %      MAP       --                Physical Exam    Vitals reviewed.  Constitutional: He appears well-developed and well-nourished.   HENT:   Head: Normocephalic and atraumatic.   Right Ear: External ear normal.   Left Ear: External ear normal.   Nose: Nose normal.   Mouth/Throat: Oropharynx is clear and  moist.   Eyes: EOM are normal. Pupils are equal, round, and reactive to light.   Cardiovascular:  Normal rate, regular rhythm, normal heart sounds and intact distal pulses.           Pulmonary/Chest: He has wheezes.   Musculoskeletal:         General: Normal range of motion.     Neurological: He is alert and oriented to person, place, and time. He has normal strength. GCS score is 15. GCS eye subscore is 4. GCS verbal subscore is 5. GCS motor subscore is 6.   Skin: Skin is warm and dry. Capillary refill takes less than 2 seconds.   Psychiatric: He has a normal mood and affect. His behavior is normal. Judgment and thought content normal.       Medical Screening Exam   See Full Note    ED Course   Procedures  Labs Reviewed   COMPREHENSIVE METABOLIC PANEL - Abnormal; Notable for the following components:       Result Value    Glucose 111 (*)     All other components within normal limits   SARS-COV2 (COVID) W/ FLU ANTIGEN - Abnormal; Notable for the following components:    Influenza A Positive (*)     All other components within normal limits    Narrative:     Negative SARS-CoV results should not be used as the sole basis for treatment or patient management decisions; negative results should be considered in the context of a patient's recent exposures, history and the presene of clinical signs and symptoms consistent with COVID-19.  Negative results should be treated as presumptive and confirmed by molecular assay, if necessary for patient management.    Notification of POSITIVE INFLUENZA A ANTIGEN made to [facility]VON HOWELL RN VERIFIED ABD CONFIRMED RECEIPT   NT-PRO NATRIURETIC PEPTIDE - Abnormal; Notable for the following components:    ProBNP 460 (*)     All other components within normal limits   CBC WITH DIFFERENTIAL - Abnormal; Notable for the following components:    RBC 4.56 (*)     Hematocrit 39.9 (*)     RDW 14.8 (*)     Neutrophils % 77.6 (*)     Lymphocytes % 11.4 (*)     Lymphocytes,  Absolute 0.80 (*)     Monocytes % 10.7 (*)     Eosinophils % 0.0 (*)     All other components within normal limits   TROPONIN I - Normal   CBC W/ AUTO DIFFERENTIAL    Narrative:     The following orders were created for panel order CBC Auto Differential.  Procedure                               Abnormality         Status                     ---------                               -----------         ------                     CBC with Differential[[id number]]        Abnormal            Final result                 Please view results for these tests on the individual orders.          Imaging Results              X-Ray Chest 1 View (Final result)  Result time 03/07/23 09:58:44      Final result by David Ramos DO (03/07/23 09:58:44)                   Impression:      No acute pulmonary disease      Electronically signed by: David Ramos  Date:    03/07/2023  Time:    09:58               Narrative:    EXAMINATION:  XR CHEST 1 VIEW    CLINICAL HISTORY:  shortness of breath;    TECHNIQUE:  XR CHEST 1 VIEW    COMPARISON:  2018    FINDINGS:  No lines or tubes.    Lungs are clear.    Normal pleura.    Cardiac silhouette is similar to comparison exam.    No obvious acute bone findings.                                       Medications   methylPREDNISolone sodium succinate injection 125 mg (125 mg Intravenous Given 3/7/23 1018)     Medical Decision Making:   Clinical Tests:   Lab Tests: Ordered and Reviewed  The following lab test(s) were unremarkable: CBC, CMP, BNP and Troponin       <> Summary of Lab: Influenza A positive  ED Management:  MDM    Patient presents for emergent evaluation of acute shortness of breath, cough and fever that poses a threat to life and/or bodily function.    In the ED patient found to have acute influenza A and bronchitis.    I ordered labs and personally reviewed them.  Labs significant for Influenza A  I ordered X-rays and personally reviewed them and reviewed the radiologist  interpretation.  Xray insignificant for acute findings.    I ordered EKG and personally reviewed it.  EKG significant for sinus bradycardia.      Discharge MDM  I discussed the treatment plan and discharge instructions with the patient including Albuterol inhaler, outpatient antibiotics and steroids.     Patient was managed in the ED with IV steroids.    The response to treatment was good.    Patient was discharged in stable condition.  Detailed return precautions discussed.                  Clinical Impression:   Final diagnoses:  [R06.02] Shortness of breath  [J10.1] Influenza A (Primary)  [J40] Bronchitis        ED Disposition Condition    Discharge Stable          ED Prescriptions       Medication Sig Dispense Start Date End Date Auth. Provider    azithromycin (Z-TAHIRA) 250 MG tablet Take 1 tablet (250 mg total) by mouth once daily. Take first 2 tablets together, then 1 every day until finished. 6 tablet 3/7/2023 -- SAMEER Rivera    predniSONE (DELTASONE) 20 MG tablet Take 2 tablets (40 mg total) by mouth once daily. for 5 days 10 tablet 3/7/2023 3/12/2023 SAMEER Rivera    albuterol (PROVENTIL HFA) 90 mcg/actuation inhaler Inhale 1-2 puffs into the lungs every 4 (four) hours as needed for Wheezing. Rescue 18 g 3/7/2023 4/6/2023 SAMEER Rivera          Follow-up Information       Follow up With Specialties Details Why Contact Info    [facility] In 1 week As needed, If symptoms worsen [address] [address]  [phone]               SAMEER Rivera  03/07/23 6399

## 2023-03-13 NOTE — ADDENDUM NOTE
Encounter addended by: Mary Kelsey on: 3/13/2023 12:15 PM   Actions taken: SmartForm saved, Flowsheet accepted

## 2023-06-16 ENCOUNTER — HOSPITAL ENCOUNTER (EMERGENCY)
Facility: HOSPITAL | Age: 48
Discharge: HOME | End: 2023-06-16
Payer: MEDICARE

## 2023-06-16 VITALS
OXYGEN SATURATION: 96 % | HEART RATE: 60 BPM | HEIGHT: 71 IN | TEMPERATURE: 98 F | SYSTOLIC BLOOD PRESSURE: 135 MMHG | DIASTOLIC BLOOD PRESSURE: 67 MMHG | RESPIRATION RATE: 18 BRPM | BODY MASS INDEX: 44.1 KG/M2 | WEIGHT: 315 LBS

## 2023-06-16 DIAGNOSIS — R10.9 ABDOMINAL PAIN, UNSPECIFIED ABDOMINAL LOCATION: Primary | ICD-10-CM

## 2023-06-16 LAB
ALBUMIN SERPL BCP-MCNC: 3.4 G/DL (ref 3.5–5)
ALBUMIN/GLOB SERPL: 1 {RATIO}
ALP SERPL-CCNC: 76 U/L (ref 45–115)
ALT SERPL W P-5'-P-CCNC: 27 U/L (ref 16–61)
ANION GAP SERPL CALCULATED.3IONS-SCNC: 14 MMOL/L (ref 7–16)
AST SERPL W P-5'-P-CCNC: 18 U/L (ref 15–37)
BASOPHILS # BLD AUTO: 0.07 K/UL (ref 0–0.2)
BASOPHILS NFR BLD AUTO: 1.2 % (ref 0–1)
BILIRUB SERPL-MCNC: 0.3 MG/DL (ref ?–1.2)
BILIRUB UR QL STRIP: NEGATIVE
BUN SERPL-MCNC: 17 MG/DL (ref 7–18)
BUN/CREAT SERPL: 15 (ref 6–20)
CALCIUM SERPL-MCNC: 8.7 MG/DL (ref 8.5–10.1)
CHLORIDE SERPL-SCNC: 107 MMOL/L (ref 98–107)
CLARITY UR: CLEAR
CO2 SERPL-SCNC: 27 MMOL/L (ref 21–32)
COLOR UR: YELLOW
CREAT SERPL-MCNC: 1.1 MG/DL (ref 0.7–1.3)
DIFFERENTIAL METHOD BLD: ABNORMAL
EGFR (NO RACE VARIABLE) (RUSH/TITUS): 82 ML/MIN/1.73M2
EOSINOPHIL # BLD AUTO: 0.39 K/UL (ref 0–0.5)
EOSINOPHIL NFR BLD AUTO: 6.9 % (ref 1–4)
ERYTHROCYTE [DISTWIDTH] IN BLOOD BY AUTOMATED COUNT: 13.4 % (ref 11.5–14.5)
GLOBULIN SER-MCNC: 3.3 G/DL (ref 2–4)
GLUCOSE SERPL-MCNC: 106 MG/DL (ref 74–106)
GLUCOSE UR STRIP-MCNC: NEGATIVE MG/DL
HCT VFR BLD AUTO: 42.9 % (ref 40–54)
HGB BLD-MCNC: 14.1 G/DL (ref 13.5–18)
KETONES UR STRIP-SCNC: NEGATIVE MG/DL
LEUKOCYTE ESTERASE UR QL STRIP: NEGATIVE
LIPASE SERPL-CCNC: 89 U/L (ref 73–393)
LYMPHOCYTES # BLD AUTO: 1.35 K/UL (ref 1–4.8)
LYMPHOCYTES NFR BLD AUTO: 23.9 % (ref 27–41)
MCH RBC QN AUTO: 29.5 PG (ref 27–31)
MCHC RBC AUTO-ENTMCNC: 32.9 G/DL (ref 32–36)
MCV RBC AUTO: 89.7 FL (ref 80–96)
MONOCYTES # BLD AUTO: 0.53 K/UL (ref 0–0.8)
MONOCYTES NFR BLD AUTO: 9.4 % (ref 2–6)
MPC BLD CALC-MCNC: 9.7 FL (ref 9.4–12.4)
NEUTROPHILS # BLD AUTO: 3.3 K/UL (ref 1.8–7.7)
NEUTROPHILS NFR BLD AUTO: 58.6 % (ref 53–65)
NITRITE UR QL STRIP: NEGATIVE
PH UR STRIP: 5.5 PH UNITS
PLATELET # BLD AUTO: 203 K/UL (ref 150–400)
POTASSIUM SERPL-SCNC: 4.2 MMOL/L (ref 3.5–5.1)
PROT SERPL-MCNC: 6.7 G/DL (ref 6.4–8.2)
PROT UR QL STRIP: NEGATIVE
RBC # BLD AUTO: 4.78 M/UL (ref 4.6–6.2)
RBC # UR STRIP: NEGATIVE /UL
SODIUM SERPL-SCNC: 144 MMOL/L (ref 136–145)
SP GR UR STRIP: 1.01
UROBILINOGEN UR STRIP-ACNC: 0.2 MG/DL
WBC # BLD AUTO: 5.64 K/UL (ref 4.5–11)

## 2023-06-16 PROCEDURE — 99284 EMERGENCY DEPT VISIT MOD MDM: CPT | Mod: GF | Performed by: NURSE PRACTITIONER

## 2023-06-16 PROCEDURE — 80053 COMPREHEN METABOLIC PANEL: CPT | Performed by: NURSE PRACTITIONER

## 2023-06-16 PROCEDURE — 85025 COMPLETE CBC W/AUTO DIFF WBC: CPT | Performed by: NURSE PRACTITIONER

## 2023-06-16 PROCEDURE — 99284 EMERGENCY DEPT VISIT MOD MDM: CPT | Mod: 25

## 2023-06-16 PROCEDURE — 83690 ASSAY OF LIPASE: CPT | Performed by: NURSE PRACTITIONER

## 2023-06-16 PROCEDURE — 81003 URINALYSIS AUTO W/O SCOPE: CPT | Performed by: NURSE PRACTITIONER

## 2023-06-16 RX ORDER — METFORMIN HYDROCHLORIDE 500 MG/1
500 TABLET ORAL
COMMUNITY

## 2023-06-16 NOTE — ED PROVIDER NOTES
Encounter Date: 6/16/2023       History     Chief Complaint   Patient presents with    Abdominal Pain      Pain is under right rib at Wed     49 y/o male presents c/o RUQ abdominal pain. Has had several episodes of this pain and nausea over the past few months. He does have RUQ tenderness. Denies vomiting.    Review of patient's allergies indicates:   Allergen Reactions    Opioids - morphine analogues Hives and Rash     Only morphine     Past Medical History:   Diagnosis Date    Asthma     Diabetes mellitus, type 2     Hypertension      History reviewed. No pertinent surgical history.  History reviewed. No pertinent family history.  Social History     Tobacco Use    Smoking status: Never    Smokeless tobacco: Never   Substance Use Topics    Alcohol use: Not Currently    Drug use: Never     Review of Systems   Constitutional:  Negative for activity change, appetite change and fever.   Respiratory:  Negative for cough and shortness of breath.    Cardiovascular:  Negative for chest pain.   Gastrointestinal:  Positive for abdominal pain. Negative for diarrhea and vomiting.   Musculoskeletal:  Negative for back pain and neck pain.   Psychiatric/Behavioral:  Negative for confusion.    All other systems reviewed and are negative.    Physical Exam     Initial Vitals   BP Pulse Resp Temp SpO2   06/16/23 1248 06/16/23 1244 06/16/23 1244 06/16/23 1244 06/16/23 1244   (!) 158/87 67 18 98.3 °F (36.8 °C) 95 %      MAP       --                Physical Exam    Nursing note and vitals reviewed.  Constitutional: He appears well-developed and well-nourished. No distress.   Eyes: EOM are normal. Pupils are equal, round, and reactive to light.   Cardiovascular:  Normal rate and regular rhythm.           Pulmonary/Chest: Breath sounds normal. No respiratory distress.   Abdominal: Abdomen is soft. There is abdominal tenderness (RUQ).     Neurological: He is alert and oriented to person, place, and time. He has normal strength. GCS score  is 15. GCS eye subscore is 4. GCS verbal subscore is 5. GCS motor subscore is 6.   Skin: Skin is warm and dry. Capillary refill takes less than 2 seconds.   Psychiatric: He has a normal mood and affect.       Medical Screening Exam   See Full Note    ED Course   Procedures  Labs Reviewed   COMPREHENSIVE METABOLIC PANEL - Abnormal; Notable for the following components:       Result Value    Albumin 3.4 (*)     All other components within normal limits   CBC WITH DIFFERENTIAL - Abnormal; Notable for the following components:    Lymphocytes % 23.9 (*)     Monocytes % 9.4 (*)     Eosinophils % 6.9 (*)     Basophils % 1.2 (*)     All other components within normal limits   LIPASE - Normal   CBC W/ AUTO DIFFERENTIAL    Narrative:     The following orders were created for panel order CBC auto differential.  Procedure                               Abnormality         Status                     ---------                               -----------         ------                     CBC with Differential[[id number]]        Abnormal            Final result                 Please view results for these tests on the individual orders.   URINALYSIS, REFLEX TO URINE CULTURE          Imaging Results              US Abdomen Limited_Gallbladder (Final result)  Result time 06/16/23 14:08:09      Final result by David Ramos DO (06/16/23 14:08:09)                   Impression:      Mild to moderate diffuse hepatic steatosis.    The gallbladder appeared normal.  A positive Urbano's sign was documented although this could be a false positive as the gallbladder appears normal.    Ultrasound images captured and stored.      Electronically signed by: David Ramos  Date:    06/16/2023  Time:    14:08               Narrative:    EXAMINATION:  US ABDOMEN LIMITED_GALLBLADDER    CLINICAL HISTORY:  RUQ abd pain;    TECHNIQUE:  Routine limited abdominal ultrasound was performed.    COMPARISON:  2022    FINDINGS:  The liver demonstrates no  definite focal abnormality.  The liver demonstrates mild to moderate diffuse hepatic steatosis.  And is normal in size.    There is no evidence for cholelithiasis, gallbladder wall thickening, or pericholecystic fluid.    The common bile duct measures 0.5 cm.    The right kidney measures 12.5 cm.  The right kidney is normal.    The pancreas is not visualized.    The visualized aorta and IVC are unremarkable in appearance.                                       Medications - No data to display  Medical Decision Making:   Initial Assessment:   49 y/o male presents c/o RUQ abdominal pain. Has had several episodes of this pain and nausea over the past few months. He does have RUQ tenderness. Denies vomiting.      Differential Diagnosis:   Cholecystitis vs gastroenteritis  Clinical Tests:   Lab Tests: Ordered and Reviewed  The following lab test(s) were unremarkable: CBC, BMP and Urinalysis  Radiological Study: Ordered and Reviewed  ED Management:  Gallbladder US- negative. His labs were unremarkable- CBC, BMP, lipase, UA. He does have tenderness but pain controlled. Pt is tolerating PO. He did not have PCP so we called and made him an appointment with Dr. Ga for Wednesday. Instructed pt on strict return to ED precautions.                       Clinical Impression:   Final diagnoses:  [R10.9] Abdominal pain, unspecified abdominal location (Primary)        ED Disposition Condition    Discharge Stable          ED Prescriptions    None       Follow-up Information    None          SAMEER Erickson  06/16/23 1938

## 2023-06-16 NOTE — DISCHARGE INSTRUCTIONS
Follow up with Dr. Ga on 6/21/23 at 11:00.  [place]/low fat diet.  Return to Emergency Department for any new or worsening symptoms.

## 2023-06-21 ENCOUNTER — HOSPITAL ENCOUNTER (OUTPATIENT)
Dept: RADIOLOGY | Facility: HOSPITAL | Age: 48
Discharge: HOME | End: 2023-06-21
Attending: FAMILY MEDICINE
Payer: MEDICARE

## 2023-06-21 ENCOUNTER — OFFICE VISIT (OUTPATIENT)
Dept: FAMILY MEDICINE | Facility: CLINIC | Age: 48
End: 2023-06-21
Payer: MEDICARE

## 2023-06-21 VITALS
DIASTOLIC BLOOD PRESSURE: 83 MMHG | WEIGHT: 315 LBS | SYSTOLIC BLOOD PRESSURE: 133 MMHG | HEIGHT: 71 IN | BODY MASS INDEX: 44.1 KG/M2 | HEART RATE: 64 BPM

## 2023-06-21 DIAGNOSIS — R10.11 RUQ PAIN: Primary | ICD-10-CM

## 2023-06-21 DIAGNOSIS — R73.9 HYPERGLYCEMIA: Chronic | ICD-10-CM

## 2023-06-21 DIAGNOSIS — E66.01 MORBID OBESITY: Chronic | ICD-10-CM

## 2023-06-21 DIAGNOSIS — R10.11 RUQ PAIN: ICD-10-CM

## 2023-06-21 DIAGNOSIS — G89.29 CHRONIC RUQ PAIN: Chronic | ICD-10-CM

## 2023-06-21 DIAGNOSIS — R10.11 CHRONIC RUQ PAIN: Chronic | ICD-10-CM

## 2023-06-21 DIAGNOSIS — K76.0 FATTY LIVER: Chronic | ICD-10-CM

## 2023-06-21 LAB
ALBUMIN SERPL BCP-MCNC: 3.9 G/DL (ref 3.5–5)
ALP SERPL-CCNC: 83 U/L (ref 45–115)
ALT SERPL W P-5'-P-CCNC: 25 U/L (ref 16–61)
AST SERPL W P-5'-P-CCNC: 16 U/L (ref 15–37)
BILIRUB DIRECT SERPL-MCNC: <0.1 MG/DL (ref 0–0.2)
BILIRUB SERPL-MCNC: 0.3 MG/DL (ref ?–1.2)
EST. AVERAGE GLUCOSE BLD GHB EST-MCNC: 104 MG/DL
HBA1C MFR BLD HPLC: 5.7 % (ref 4.5–6.6)
HCV AB SER QL: NORMAL
PROT SERPL-MCNC: 7.1 G/DL (ref 6.4–8.2)

## 2023-06-21 PROCEDURE — 74018 RADEX ABDOMEN 1 VIEW: CPT | Mod: TC

## 2023-06-21 PROCEDURE — 83036 HEMOGLOBIN A1C: ICD-10-PCS | Mod: ,,, | Performed by: CLINICAL MEDICAL LABORATORY

## 2023-06-21 PROCEDURE — 86803 HEPATITIS C ANTIBODY: ICD-10-PCS | Mod: ,,, | Performed by: CLINICAL MEDICAL LABORATORY

## 2023-06-21 PROCEDURE — 86803 HEPATITIS C AB TEST: CPT | Mod: ,,, | Performed by: CLINICAL MEDICAL LABORATORY

## 2023-06-21 PROCEDURE — 99214 OFFICE O/P EST MOD 30 MIN: CPT | Mod: ,,, | Performed by: FAMILY MEDICINE

## 2023-06-21 PROCEDURE — 80076 HEPATIC FUNCTION PANEL: ICD-10-PCS | Mod: ,,, | Performed by: CLINICAL MEDICAL LABORATORY

## 2023-06-21 PROCEDURE — 80076 HEPATIC FUNCTION PANEL: CPT | Mod: ,,, | Performed by: CLINICAL MEDICAL LABORATORY

## 2023-06-21 PROCEDURE — 99214 PR OFFICE/OUTPT VISIT, EST, LEVL IV, 30-39 MIN: ICD-10-PCS | Mod: ,,, | Performed by: FAMILY MEDICINE

## 2023-06-21 PROCEDURE — 83036 HEMOGLOBIN GLYCOSYLATED A1C: CPT | Mod: ,,, | Performed by: CLINICAL MEDICAL LABORATORY

## 2023-06-21 NOTE — PATIENT INSTRUCTIONS
Ait is possible that you have acalculous cholecystitis, which is pain due to a gall bladder that has no stones but does not contract properly.  You may also have a condition called hepatic flexure syndrome.  Your recent ultrasound did not show any gall stones but did show some fatty infiltration of your liver.      US ABDOMEN LIMITED_GALLBLADDER     CLINICAL HISTORY:  RUQ abd pain;     TECHNIQUE:  Routine limited abdominal ultrasound was performed.     COMPARISON:  2022     FINDINGS:  The liver demonstrates no definite focal abnormality.  The liver demonstrates mild to moderate diffuse hepatic steatosis.  And is normal in size.     There is no evidence for cholelithiasis, gallbladder wall thickening, or pericholecystic fluid.     The common bile duct measures 0.5 cm.     The right kidney measures 12.5 cm.  The right kidney is normal.     The pancreas is not visualized.     The visualized aorta and IVC are unremarkable in appearance.     Impression:     Mild to moderate diffuse hepatic steatosis.     The gallbladder appeared normal.  A positive Urbano's sign was documented although this could be a false positive as the gallbladder appears normal.     Ultrasound images captured and stored.        Electronically signed by: David Ramos  Date:                                            06/16/2023  Time:                                           14:08

## 2023-06-21 NOTE — PROGRESS NOTES
Pardeep Ga MD   [facility]  [facility] [address] [place] [address]  [phone]      PATIENT NAME: Ubaldo Quach  : 1975  DATE: 23  MRN: [id number]      Billing Provider: Pardeep Ga MD  Level of Service:   Patient PCP Information       Provider PCP Type    Pardeep Ga MD General            Reason for Visit / Chief Complaint: Establish Care (Has been under the care of Dr. Mendez. ), Abdominal Pain (Made worse with eating), Nausea, Dizziness, Blurred Vision (/), Foot Swelling (Left foot), and Follow-up (Follow-up from ER visit on 2023 for abdominal pain. )       Update PCP  Update Chief Complaint         History of Present Illness / Problem Focused Workflow     Ubaldo Quach presents to the clinic with Establish Care (Has been under the care of Dr. Mendez. ), Abdominal Pain (Made worse with eating), Nausea, Dizziness, Blurred Vision (/), Foot Swelling (Left foot), and Follow-up (Follow-up from ER visit on 2023 for abdominal pain. )       47 yo WM here for c/o RUQ abdominal pain.  Has associated belching, nausea, vomiting and dizziness.  Has also recently had blurred vision and pedal edema.  He says that his GI symptoms have been occurring approximately every 6 months ever since .  Has been seen in ED for symptoms on multiple occasions.  PCP is Dr. Mendez at [facility].  Per pt, he was told in past that he was prediabetic and is on metformin.  Is on chronic opioid medications due to traumatic vertebral fracture.  Was recently seen in ED and had labs and ultrasound done.  Labs were essentially wnl.  Ultrasound showed some fatthy liver disease but did not show any evidence of gall bladder disease.      I have reviewed his recent ED note and associated lab and imaging results.    Review of Systems     Review of Systems   Constitutional:  Negative for chills and fever.   HENT:  Negative for mouth  sores and sore throat.    Eyes:  Positive for visual disturbance.   Respiratory:  Negative for shortness of breath.    Cardiovascular:  Positive for leg swelling. Negative for chest pain and palpitations.   Gastrointestinal:  Positive for abdominal pain, nausea and vomiting. Negative for blood in stool, change in bowel habit, constipation (reports small volume stools daily), diarrhea, fecal incontinence and change in bowel habit.   Genitourinary:  Negative for dysuria.   Musculoskeletal:  Positive for arthralgias, back pain and neck pain.   Neurological:  Positive for dizziness and light-headedness. Negative for vertigo, tremors, seizures, syncope and weakness.      Medical / Social / Family History     Past Medical History:   Diagnosis Date    Asthma     Diabetes mellitus, type 2     Hypertension        History reviewed. No pertinent surgical history.    Social History    reports that he has never smoked. He has never been exposed to tobacco smoke. He has never used smokeless tobacco. He reports that he does not currently use alcohol. He reports that he does not use drugs.   Social History     Tobacco Use    Smoking status: Never     Passive exposure: Never    Smokeless tobacco: Never   Substance Use Topics    Alcohol use: Not Currently    Drug use: Never       Family History  History reviewed. No pertinent family history.    Medications and Allergies     Medications  Outpatient Medications Marked as Taking for the 6/21/23 encounter (Office Visit) with Pardeep Ga MD   Medication Sig Dispense Refill    albuterol (PROVENTIL HFA) 90 mcg/actuation inhaler Inhale 1-2 puffs into the lungs every 4 (four) hours as needed for Wheezing. Rescue 18 g 0    gabapentin (NEURONTIN) 300 MG capsule Take 300 mg by mouth every evening.      HYDROcodone-acetaminophen (NORCO)  mg per tablet Take 1 tablet by mouth 3 (three) times daily.      metFORMIN (GLUCOPHAGE) 500 MG tablet Take 500 mg by mouth daily with breakfast.       omeprazole (PRILOSEC) 40 MG capsule Take 40 mg by mouth 2 (two) times daily before meals.         Allergies  Review of patient's allergies indicates:   Allergen Reactions    Opioids - morphine analogues Hives and Rash     Only morphine       Physical Examination     Vitals:    06/21/23 1119   BP: 133/83   Pulse: 64     Physical Exam  Vitals reviewed.   Constitutional:       General: He is not in acute distress.     Appearance: Normal appearance. He is obese. He is not ill-appearing or toxic-appearing.   HENT:      Head: Normocephalic and atraumatic.   Eyes:      Extraocular Movements: Extraocular movements intact.      Conjunctiva/sclera: Conjunctivae normal.      Pupils: Pupils are equal, round, and reactive to light.   Cardiovascular:      Rate and Rhythm: Normal rate and regular rhythm.      Heart sounds: Normal heart sounds.   Pulmonary:      Effort: Pulmonary effort is normal.      Breath sounds: Normal breath sounds.   Abdominal:      General: There is no distension.      Tenderness: There is abdominal tenderness. There is no guarding or rebound.      Comments: Hypoactive BS   Musculoskeletal:         General: Normal range of motion.      Cervical back: Normal range of motion and neck supple.   Skin:     General: Skin is warm and dry.   Neurological:      General: No focal deficit present.      Mental Status: He is alert and oriented to person, place, and time.   Psychiatric:         Mood and Affect: Mood normal.         Behavior: Behavior normal.      Office Visit on 06/21/2023   Component Date Value Ref Range Status    Hemoglobin A1C 06/21/2023 5.7  4.5 - 6.6 % Final      Normal:               <5.7%  Pre-Diabetic:       5.7% to 6.4%  Diabetic:             >6.4%  Diabetic Goal:     <7%    Estimated Average Glucose 06/21/2023 104  mg/dL Final    Hepatitis C Ab 06/21/2023 Non-Reactive  Non-Reactive Final    Total Protein 06/21/2023 7.1  6.4 - 8.2 g/dL Final    Albumin 06/21/2023 3.9  3.5 - 5.0 g/dL Final     Bilirubin, Total 06/21/2023 0.3  >0.0 - 1.2 mg/dL Final    Bilirubin, Direct 06/21/2023 <0.1  0.0 - 0.2 mg/dL Final    AST 06/21/2023 16  15 - 37 U/L Final    ALT 06/21/2023 25  16 - 61 U/L Final    Alk Phos 06/21/2023 83  45 - 115 U/L Final   Admission on 06/16/2023, Discharged on 06/16/2023   Component Date Value Ref Range Status    Sodium 06/16/2023 144  136 - 145 mmol/L Final    Potassium 06/16/2023 4.2  3.5 - 5.1 mmol/L Final    Chloride 06/16/2023 107  98 - 107 mmol/L Final    CO2 06/16/2023 27  21 - 32 mmol/L Final    Anion Gap 06/16/2023 14  7 - 16 mmol/L Final    Glucose 06/16/2023 106  74 - 106 mg/dL Final    BUN 06/16/2023 17  7 - 18 mg/dL Final    Creatinine 06/16/2023 1.10  0.70 - 1.30 mg/dL Final    BUN/Creatinine Ratio 06/16/2023 15  6 - 20 Final    Calcium 06/16/2023 8.7  8.5 - 10.1 mg/dL Final    Total Protein 06/16/2023 6.7  6.4 - 8.2 g/dL Final    Albumin 06/16/2023 3.4 (L)  3.5 - 5.0 g/dL Final    Globulin 06/16/2023 3.3  2.0 - 4.0 g/dL Final    A/G Ratio 06/16/2023 1.0   Final    Bilirubin, Total 06/16/2023 0.3  >0.0 - 1.2 mg/dL Final    Alk Phos 06/16/2023 76  45 - 115 U/L Final    ALT 06/16/2023 27  16 - 61 U/L Final    AST 06/16/2023 18  15 - 37 U/L Final    eGFR 06/16/2023 82  >=60 mL/min/1.73m2 Final    Color, UA 06/16/2023 Yellow  Colorless, Straw, Yellow, Light Yellow, Dark Yellow Final    Clarity, UA 06/16/2023 Clear  Clear Final    pH, UA 06/16/2023 5.5  5.0 to 8.0 pH Units Final    Leukocytes, UA 06/16/2023 Negative  Negative Final    Nitrites, UA 06/16/2023 Negative  Negative Final    Protein, UA 06/16/2023 Negative  Negative Final    Glucose, UA 06/16/2023 Negative  Normal mg/dL Final    Ketones, UA 06/16/2023 Negative  Negative mg/dL Final    Urobilinogen, UA 06/16/2023 0.2  0.2, 1.0, Normal mg/dL Final    Bilirubin, UA 06/16/2023 Negative  Negative Final    Blood, UA 06/16/2023 Negative  Negative Final    Specific Gravity, UA 06/16/2023 1.015  <=1.005, 1.010, 1.015, 1.020, 1.025,  1.030 Final    Lipase 06/16/2023 89  73 - 393 U/L Final    WBC 06/16/2023 5.64  4.50 - 11.00 K/uL Final    RBC 06/16/2023 4.78  4.60 - 6.20 M/uL Final    Hemoglobin 06/16/2023 14.1  13.5 - 18.0 g/dL Final    Hematocrit 06/16/2023 42.9  40.0 - 54.0 % Final    MCV 06/16/2023 89.7  80.0 - 96.0 fL Final    MCH 06/16/2023 29.5  27.0 - 31.0 pg Final    MCHC 06/16/2023 32.9  32.0 - 36.0 g/dL Final    RDW 06/16/2023 13.4  11.5 - 14.5 % Final    Platelet Count 06/16/2023 203  150 - 400 K/uL Final    MPV 06/16/2023 9.7  9.4 - 12.4 fL Final    Neutrophils % 06/16/2023 58.6  53.0 - 65.0 % Final    Lymphocytes % 06/16/2023 23.9 (L)  27.0 - 41.0 % Final    Neutrophils, Abs 06/16/2023 3.30  1.80 - 7.70 K/uL Final    Lymphocytes, Absolute 06/16/2023 1.35  1.00 - 4.80 K/uL Final    Diff Type 06/16/2023 Auto   Final    Monocytes % 06/16/2023 9.4 (H)  2.0 - 6.0 % Final    Eosinophils % 06/16/2023 6.9 (H)  1.0 - 4.0 % Final    Basophils % 06/16/2023 1.2 (H)  0.0 - 1.0 % Final    Monocytes, Absolute 06/16/2023 0.53  0.00 - 0.80 K/uL Final    Eosinophils, Absolute 06/16/2023 0.39  0.00 - 0.50 K/uL Final    Basophils, Absolute 06/16/2023 0.07  0.00 - 0.20 K/uL Final     X-Ray KUB  Narrative: EXAMINATION:  XR KUB    CLINICAL HISTORY:  .  Right upper quadrant pain    COMPARISON:  November 8, 2022    TECHNIQUE:  KUB    FINDINGS:  The bowel gas pattern is nonobstructive without gross mass lesion.  There is no radiopaque calculus.  There is mild lumbar spondylosis  Impression: No acute process    Electronically signed by: José Luis Mcmillan  Date:    06/21/2023  Time:    12:37    EXAMINATION:  US ABDOMEN LIMITED_GALLBLADDER     CLINICAL HISTORY:  RUQ abd pain;     TECHNIQUE:  Routine limited abdominal ultrasound was performed.     COMPARISON:  2022     FINDINGS:  The liver demonstrates no definite focal abnormality.  The liver demonstrates mild to moderate diffuse hepatic steatosis.  And is normal in size.     There is no evidence for  cholelithiasis, gallbladder wall thickening, or pericholecystic fluid.     The common bile duct measures 0.5 cm.     The right kidney measures 12.5 cm.  The right kidney is normal.     The pancreas is not visualized.     The visualized aorta and IVC are unremarkable in appearance.     Impression:     Mild to moderate diffuse hepatic steatosis.     The gallbladder appeared normal.  A positive Urbano's sign was documented although this could be a false positive as the gallbladder appears normal.     Ultrasound images captured and stored.        Electronically signed by: David Ramos  Date:                                            06/16/2023  Time:                                           14:08           Exam Ended: 06/16/23 13:50 Last Resulted: 06/16/23 14:08             Assessment and Plan (including Health Maintenance)      Problem List  Smart Sets  Document Outside HM   :    Plan: discussed DDX with patient including acalculous cholecystitis or hepatic flexure syndrome likely due to chronic opioid induced constipation.        Health Maintenance Due   Topic Date Due    COVID-19 Vaccine (1) Never done    HIV Screening  Never done    TETANUS VACCINE  Never done    Colorectal Cancer Screening  Never done       Problem List Items Addressed This Visit          Endocrine    Hyperglycemia (Chronic)    Relevant Orders    Hemoglobin A1C (Completed)    Morbid obesity       GI    Chronic RUQ pain (Chronic)    Relevant Orders    Hepatitis C Antibody (Completed)    Fatty liver (Chronic)    Relevant Orders    Hepatic Function Panel (Completed)     Other Visit Diagnoses       RUQ pain    -  Primary    Relevant Orders    Hepatic Function Panel (Completed)    X-Ray KUB (Completed)            Health Maintenance Topics with due status: Not Due       Topic Last Completion Date    Lipid Panel 07/07/2021    Hemoglobin A1c (Prediabetes) 06/21/2023    Influenza Vaccine Not Due       No future appointments.         Signature:  Pardeep  MD MARYSE Leos [facility]  [facility] OF [address] [address]  [phone]    Date of encounter: 6/21/23

## 2023-06-23 ENCOUNTER — OFFICE VISIT (OUTPATIENT)
Dept: FAMILY MEDICINE | Facility: CLINIC | Age: 48
End: 2023-06-23
Payer: MEDICARE

## 2023-06-23 VITALS
DIASTOLIC BLOOD PRESSURE: 85 MMHG | SYSTOLIC BLOOD PRESSURE: 131 MMHG | WEIGHT: 315 LBS | BODY MASS INDEX: 44.1 KG/M2 | HEIGHT: 71 IN

## 2023-06-23 DIAGNOSIS — K59.03 THERAPEUTIC OPIOID-INDUCED CONSTIPATION (OIC): ICD-10-CM

## 2023-06-23 DIAGNOSIS — R10.11 CHRONIC RUQ PAIN: ICD-10-CM

## 2023-06-23 DIAGNOSIS — T40.2X5A THERAPEUTIC OPIOID-INDUCED CONSTIPATION (OIC): ICD-10-CM

## 2023-06-23 DIAGNOSIS — K59.89 HEPATIC FLEXURE SYNDROME: Primary | ICD-10-CM

## 2023-06-23 DIAGNOSIS — G89.29 CHRONIC RUQ PAIN: ICD-10-CM

## 2023-06-23 PROCEDURE — 99214 PR OFFICE/OUTPT VISIT, EST, LEVL IV, 30-39 MIN: ICD-10-PCS | Mod: ,,, | Performed by: FAMILY MEDICINE

## 2023-06-23 PROCEDURE — 99214 OFFICE O/P EST MOD 30 MIN: CPT | Mod: ,,, | Performed by: FAMILY MEDICINE

## 2023-06-23 NOTE — PROGRESS NOTES
Pardeep Ga MD   [facility]  [facility] [address] [address]  [phone]      PATIENT NAME: Ubaldo Quach  : 1975  DATE: 23  MRN: [id number]      Billing Provider: Pardeep Ga MD  Level of Service:   Patient PCP Information       Provider PCP Type    Pardeep Ga MD General            Reason for Visit / Chief Complaint: Results       Update PCP  Update Chief Complaint         History of Present Illness / Problem Focused Workflow     Ubaldo Quach presents to the clinic with Results       Follow up from visit on 23.  Lab results discussed in detail and all questions and concerns were addressed.  No significant lab abnormalities.  Xray showed large amounts of stool  and gas throughout colon.    Review of Systems     Review of Systems   Constitutional:  Negative for chills and fever.   HENT:  Negative for mouth sores and sore throat.    Eyes:  Positive for visual disturbance.   Respiratory:  Negative for shortness of breath.    Cardiovascular:  Positive for leg swelling. Negative for chest pain and palpitations.   Gastrointestinal:  Positive for abdominal pain, nausea and vomiting. Negative for blood in stool, change in bowel habit, constipation (reports small volume stools daily), diarrhea, fecal incontinence and change in bowel habit.   Genitourinary:  Negative for dysuria.   Musculoskeletal:  Positive for arthralgias, back pain and neck pain.   Neurological:  Positive for dizziness and light-headedness. Negative for vertigo, tremors, seizures, syncope and weakness.      Medical / Social / Family History     Past Medical History:   Diagnosis Date    Asthma     Diabetes mellitus, type 2     Hypertension        History reviewed. No pertinent surgical history.    Social History    reports that he has never smoked. He has never been exposed to tobacco smoke. He has never used smokeless tobacco. He reports  that he does not currently use alcohol. He reports that he does not use drugs.   Social History     Tobacco Use    Smoking status: Never     Passive exposure: Never    Smokeless tobacco: Never   Substance Use Topics    Alcohol use: Not Currently    Drug use: Never       Family History  History reviewed. No pertinent family history.    Medications and Allergies     Medications  No outpatient medications have been marked as taking for the 6/23/23 encounter (Office Visit) with Pardeep Ga MD.       Allergies  Review of patient's allergies indicates:   Allergen Reactions    Opioids - morphine analogues Hives and Rash     Only morphine       Physical Examination     Vitals:    06/23/23 1634   BP: 131/85     Physical Exam  Vitals reviewed.   Constitutional:       General: He is not in acute distress.     Appearance: Normal appearance. He is obese. He is not ill-appearing or toxic-appearing.   HENT:      Head: Normocephalic and atraumatic.   Eyes:      Extraocular Movements: Extraocular movements intact.      Conjunctiva/sclera: Conjunctivae normal.      Pupils: Pupils are equal, round, and reactive to light.   Cardiovascular:      Rate and Rhythm: Normal rate and regular rhythm.      Heart sounds: Normal heart sounds.   Pulmonary:      Effort: Pulmonary effort is normal.      Breath sounds: Normal breath sounds.   Abdominal:      General: There is no distension.      Tenderness: There is abdominal tenderness. There is no guarding or rebound.      Comments: Hypoactive BS   Musculoskeletal:         General: Normal range of motion.      Cervical back: Normal range of motion and neck supple.   Skin:     General: Skin is warm and dry.   Neurological:      General: No focal deficit present.      Mental Status: He is alert and oriented to person, place, and time.   Psychiatric:         Mood and Affect: Mood normal.         Behavior: Behavior normal.      Office Visit on 06/21/2023   Component Date Value Ref Range Status     Hemoglobin A1C 06/21/2023 5.7  4.5 - 6.6 % Final      Normal:               <5.7%  Pre-Diabetic:       5.7% to 6.4%  Diabetic:             >6.4%  Diabetic Goal:     <7%    Estimated Average Glucose 06/21/2023 104  mg/dL Final    Hepatitis C Ab 06/21/2023 Non-Reactive  Non-Reactive Final    Total Protein 06/21/2023 7.1  6.4 - 8.2 g/dL Final    Albumin 06/21/2023 3.9  3.5 - 5.0 g/dL Final    Bilirubin, Total 06/21/2023 0.3  >0.0 - 1.2 mg/dL Final    Bilirubin, Direct 06/21/2023 <0.1  0.0 - 0.2 mg/dL Final    AST 06/21/2023 16  15 - 37 U/L Final    ALT 06/21/2023 25  16 - 61 U/L Final    Alk Phos 06/21/2023 83  45 - 115 U/L Final     X-Ray KUB  Narrative: EXAMINATION:  XR KUB    CLINICAL HISTORY:  .  Right upper quadrant pain    COMPARISON:  November 8, 2022    TECHNIQUE:  KUB    FINDINGS:  The bowel gas pattern is nonobstructive without gross mass lesion.  There is no radiopaque calculus.  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  Will initiate trial of linzess and miralax.        Health Maintenance Due   Topic Date Due    COVID-19 Vaccine (1) Never done    Pneumococcal Vaccines (Age 0-64) (1 - PCV) Never done    HIV Screening  Never done    TETANUS VACCINE  Never done    Colorectal Cancer Screening  Never done       Problem List Items Addressed This Visit          GI    Chronic RUQ pain (Chronic)     Other Visit Diagnoses       Hepatic flexure syndrome    -  Primary    Therapeutic opioid-induced constipation (OIC)        Relevant Medications    linaCLOtide (LINZESS) 145 mcg Cap capsule            Health Maintenance Topics with due status: Not Due       Topic Last Completion Date    Lipid Panel 07/07/2021    Hemoglobin A1c  (Prediabetes) 06/21/2023    Influenza Vaccine Not Due       No future appointments.         Signature:  MD MARYSE [facility]  MEDICAL GROUP [facility] - FAMILY MEDICINE  [address] [place] [id number] [phone]    Date of encounter: 6/23/23

## 2023-06-27 PROBLEM — R73.9 HYPERGLYCEMIA: Chronic | Status: ACTIVE | Noted: 2023-06-27

## 2023-06-27 PROBLEM — K76.0 FATTY LIVER: Chronic | Status: ACTIVE | Noted: 2023-06-27

## 2023-06-27 PROBLEM — G89.29 CHRONIC RUQ PAIN: Chronic | Status: ACTIVE | Noted: 2023-06-27

## 2023-06-27 PROBLEM — E66.01 MORBID OBESITY: Status: ACTIVE | Noted: 2023-06-27

## 2023-06-27 PROBLEM — R10.11 CHRONIC RUQ PAIN: Chronic | Status: ACTIVE | Noted: 2023-06-27

## 2023-07-16 ENCOUNTER — HOSPITAL ENCOUNTER (EMERGENCY)
Facility: HOSPITAL | Age: 48
Discharge: HOME | End: 2023-07-16
Payer: MEDICARE

## 2023-07-16 VITALS
OXYGEN SATURATION: 99 % | TEMPERATURE: 98 F | WEIGHT: 315 LBS | HEIGHT: 71 IN | SYSTOLIC BLOOD PRESSURE: 166 MMHG | HEART RATE: 64 BPM | BODY MASS INDEX: 44.1 KG/M2 | RESPIRATION RATE: 18 BRPM | DIASTOLIC BLOOD PRESSURE: 61 MMHG

## 2023-07-16 DIAGNOSIS — L24.9 ACUTE IRRITANT CONTACT DERMATITIS: ICD-10-CM

## 2023-07-16 DIAGNOSIS — L03.115 CELLULITIS OF RIGHT LOWER EXTREMITY: Primary | ICD-10-CM

## 2023-07-16 PROCEDURE — 63600175 PHARM REV CODE 636 W HCPCS: Performed by: NURSE PRACTITIONER

## 2023-07-16 PROCEDURE — 99284 EMERGENCY DEPT VISIT MOD MDM: CPT

## 2023-07-16 PROCEDURE — 99284 EMERGENCY DEPT VISIT MOD MDM: CPT | Mod: GF | Performed by: NURSE PRACTITIONER

## 2023-07-16 PROCEDURE — 96372 THER/PROPH/DIAG INJ SC/IM: CPT | Performed by: NURSE PRACTITIONER

## 2023-07-16 RX ORDER — PREDNISONE 20 MG/1
40 TABLET ORAL DAILY
Qty: 10 TABLET | Refills: 0 | Status: SHIPPED | OUTPATIENT
Start: 2023-07-16 | End: 2023-07-21

## 2023-07-16 RX ORDER — DEXAMETHASONE SODIUM PHOSPHATE 4 MG/ML
8 INJECTION, SOLUTION INTRA-ARTICULAR; INTRALESIONAL; INTRAMUSCULAR; INTRAVENOUS; SOFT TISSUE
Status: COMPLETED | OUTPATIENT
Start: 2023-07-16 | End: 2023-07-16

## 2023-07-16 RX ORDER — SULFAMETHOXAZOLE AND TRIMETHOPRIM 800; 160 MG/1; MG/1
1 TABLET ORAL 2 TIMES DAILY
Qty: 14 TABLET | Refills: 0 | Status: SHIPPED | OUTPATIENT
Start: 2023-07-16 | End: 2023-07-23

## 2023-07-16 RX ADMIN — DEXAMETHASONE SODIUM PHOSPHATE 8 MG: 4 INJECTION, SOLUTION INTRA-ARTICULAR; INTRALESIONAL; INTRAMUSCULAR; INTRAVENOUS; SOFT TISSUE at 06:07

## 2023-07-16 NOTE — ED TRIAGE NOTES
Pt has dark red rash to upper legs isidro and torso. He has used banadryl cream but has not help. He also stated he has been working in old house and around insulation but it has not ever been this bad.

## 2023-07-16 NOTE — ED PROVIDER NOTES
Encounter Date: 7/16/2023       History     Chief Complaint   Patient presents with    Rash     Red areas to both legs and abd, dark red, started on Thursday.     Patient presents to the ED with complaints of rash to his inner thighs, lower abdomen and bilateral arms. Patient reports the rash started after he was taking an old floor out that had old insulation also. Patient states he took some Benadryl and has placed Benadryl cream on the areas but it has not helped. Patient states it has been present for 4 days. Denies any fever or itching but reports some of the areas feel warm.     The history is provided by the patient.   Review of patient's allergies indicates:   Allergen Reactions    Opioids - morphine analogues Hives and Rash     Only morphine     Past Medical History:   Diagnosis Date    Asthma     Diabetes mellitus, type 2     Hypertension      History reviewed. No pertinent surgical history.  History reviewed. No pertinent family history.  Social History     Tobacco Use    Smoking status: Never     Passive exposure: Never    Smokeless tobacco: Never   Substance Use Topics    Alcohol use: Not Currently    Drug use: Never     Review of Systems   Constitutional: Negative.    Respiratory: Negative.     Cardiovascular: Negative.    Musculoskeletal: Negative.    Skin:  Positive for rash (inner thighs, lower abdomen and bilateral forearms).   Neurological: Negative.    Psychiatric/Behavioral: Negative.     All other systems reviewed and are negative.    Physical Exam     Initial Vitals [07/16/23 1753]   BP Pulse Resp Temp SpO2   (!) 166/61 64 18 98.4 °F (36.9 °C) 99 %      MAP       --         Physical Exam    Vitals reviewed.  Constitutional: He appears well-developed and well-nourished.   Cardiovascular:  Normal rate and regular rhythm.           Pulmonary/Chest: Breath sounds normal.   Musculoskeletal:         General: Normal range of motion.     Neurological: He is alert and oriented to person, place, and  time. He has normal strength. GCS score is 15. GCS eye subscore is 4. GCS verbal subscore is 5. GCS motor subscore is 6.   Skin: Skin is warm and dry. Capillary refill takes less than 2 seconds. Rash noted.        Psychiatric: He has a normal mood and affect. His behavior is normal. Judgment and thought content normal.       Medical Screening Exam   See Full Note    ED Course   Procedures  Labs Reviewed - No data to display       Imaging Results    None          Medications   dexAMETHasone injection 8 mg (8 mg Intramuscular Given 7/16/23 1821)     Medical Decision Making:   ED Management:  MDM    Patient presents for emergent evaluation of acute rash that poses a threat to life and/or bodily function.    In the ED patient found to have acute contact dermatitis and cellulitis.       Discharge MDM  I discussed the treatment and discharge plan with the patient including an injection of steroids tonight in the ED and starting oral steroids and antibiotics tomorrow for the cellulitis and reaction he had to the insulation. Follow up discussed also.    Patient was managed in the ED with IM Decadron.    The response to treatment was good.    Patient was discharged in stable condition.  Detailed return precautions discussed.                        Clinical Impression:   Final diagnoses:  [L03.115] Cellulitis of right lower extremity (Primary)  [L24.9] Acute irritant contact dermatitis        ED Disposition Condition    Discharge Stable          ED Prescriptions       Medication Sig Dispense Start Date End Date Auth. Provider    sulfamethoxazole-trimethoprim 800-160mg (BACTRIM DS) 800-160 mg Tab Take 1 tablet by mouth 2 (two) times daily. for 7 days 14 tablet 7/16/2023 7/23/2023 SAMEER Rivera    predniSONE (DELTASONE) 20 MG tablet Take 2 tablets (40 mg total) by mouth once daily. for 5 days 10 tablet 7/16/2023 7/21/2023 SAMEER Rivera          Follow-up Information    None          SAMEER Rivera  07/16/23  1825

## 2023-08-23 ENCOUNTER — OFFICE VISIT (OUTPATIENT)
Dept: FAMILY MEDICINE | Facility: CLINIC | Age: 48
End: 2023-08-23
Payer: MEDICARE

## 2023-08-23 VITALS
DIASTOLIC BLOOD PRESSURE: 76 MMHG | BODY MASS INDEX: 44.1 KG/M2 | HEIGHT: 71 IN | SYSTOLIC BLOOD PRESSURE: 145 MMHG | WEIGHT: 315 LBS | HEART RATE: 60 BPM

## 2023-08-23 DIAGNOSIS — R10.11 CHRONIC RUQ PAIN: Chronic | ICD-10-CM

## 2023-08-23 DIAGNOSIS — H61.21 IMPACTED CERUMEN OF RIGHT EAR: Primary | ICD-10-CM

## 2023-08-23 DIAGNOSIS — G89.29 CHRONIC RUQ PAIN: Chronic | ICD-10-CM

## 2023-08-23 PROCEDURE — 69210 PR REMOVAL IMPACTED CERUMEN REQUIRING INSTRUMENTATION, UNILATERAL: ICD-10-PCS | Mod: RT,,, | Performed by: FAMILY MEDICINE

## 2023-08-23 PROCEDURE — 99213 PR OFFICE/OUTPT VISIT, EST, LEVL III, 20-29 MIN: ICD-10-PCS | Mod: ,,, | Performed by: FAMILY MEDICINE

## 2023-08-23 PROCEDURE — 99213 OFFICE O/P EST LOW 20 MIN: CPT | Mod: ,,, | Performed by: FAMILY MEDICINE

## 2023-08-23 PROCEDURE — 69210 REMOVE IMPACTED EAR WAX UNI: CPT | Mod: RT,,, | Performed by: FAMILY MEDICINE

## 2023-08-23 NOTE — PROGRESS NOTES
Pardeep Ga MD   [facility]  MEDICAL GROUP [facility]  [address] [place] [address]  [phone]      PATIENT NAME: Ubaldo Quach  : 1975  DATE: 23  MRN: [id number]      Billing Provider: Pardeep Ga MD  Level of Service: AK OFFICE/OUTPT VISIT, EST, LEVL III, 20-29 MIN  Patient PCP Information       Provider PCP Type    Pardeep Ga MD General            Reason for Visit / Chief Complaint: Otalgia (Right ear pain, stopped up x 1 1/2 weeks)       Update PCP  Update Chief Complaint         History of Present Illness / Problem Focused Workflow     Ubaldo Quach presents to the clinic with Otalgia (Right ear pain, stopped up x 1 1/2 weeks)       Has decreased hearing but denies any significant pain.  Has had some sinus congestion.  Denies any discomfort in left ear.      RUQ pain from previous encounter improved with use of Linzess.  However, he was unable to afford rx.        Review of Systems     Review of Systems   Constitutional:  Negative for fever.   HENT:  Positive for nasal congestion. Negative for ear discharge, ear pain and sore throat.         Decreased hearing in R ear        Medical / Social / Family History     Past Medical History:   Diagnosis Date    Asthma     Diabetes mellitus, type 2     Hypertension        History reviewed. No pertinent surgical history.    Social History    reports that he has never smoked. He has never been exposed to tobacco smoke. He has never used smokeless tobacco. He reports that he does not currently use alcohol. He reports that he does not use drugs.   Social History     Tobacco Use    Smoking status: Never     Passive exposure: Never    Smokeless tobacco: Never   Substance Use Topics    Alcohol use: Not Currently    Drug use: Never       Family History  History reviewed. No pertinent family history.    Medications and Allergies     Medications  No outpatient medications have been marked as  taking for the 8/23/23 encounter (Office Visit) with Pardeep Ga MD.       Allergies  Review of patient's allergies indicates:   Allergen Reactions    Opioids - morphine analogues Hives and Rash     Only morphine       Physical Examination     Vitals:    08/23/23 1307   BP: (!) 145/76   Pulse: 60     Physical Exam  HENT:      Left Ear: Tympanic membrane and ear canal normal.      Ears:      Comments: R ear canal has cerumen impaction           Assessment and Plan (including Health Maintenance)      Problem List  Smart Sets  Document Outside HM   :    Plan: canal was irrigated with H2O2/H2O mixture to soften wax.  A large plug of cerumen was evacuated by ketan LONDON) using alligator forceps.  Patient stated that his hearing and discomfort were improved after wax was removed.  TM could then be visualized and was wnl.  Total time for patient encounter was 25 minutes.          Health Maintenance Due   Topic Date Due    COVID-19 Vaccine (1) Never done    Pneumococcal Vaccines (Age 0-64) (1 - PCV) Never done    HIV Screening  Never done    TETANUS VACCINE  Never done    Colorectal Cancer Screening  Never done       Problem List Items Addressed This Visit          GI    Chronic RUQ pain (Chronic)     Other Visit Diagnoses       Impacted cerumen of right ear    -  Primary            Health Maintenance Topics with due status: Not Due       Topic Last Completion Date    Lipid Panel 07/07/2021    Hemoglobin A1c (Diabetic Prevention Screening) 06/21/2023    Influenza Vaccine Not Due       Future Appointments   Date Time Provider Department Center   12/15/2023  1:00 PM AWV NURSE, [facility]IC[facility]            Signature:  Pardeep Ga MD  [facility]  MEDICAL GROUP [facility] - FAMILY MEDICINE  [address] [place] [address]  [phone]    Date of encounter: 8/23/23

## 2023-09-24 ENCOUNTER — HOSPITAL ENCOUNTER (EMERGENCY)
Facility: HOSPITAL | Age: 48
Discharge: HOME | End: 2023-09-24
Payer: MEDICARE

## 2023-09-24 VITALS
HEIGHT: 71 IN | WEIGHT: 315 LBS | SYSTOLIC BLOOD PRESSURE: 157 MMHG | TEMPERATURE: 98 F | RESPIRATION RATE: 18 BRPM | DIASTOLIC BLOOD PRESSURE: 79 MMHG | HEART RATE: 62 BPM | BODY MASS INDEX: 44.1 KG/M2 | OXYGEN SATURATION: 96 %

## 2023-09-24 DIAGNOSIS — H60.91 OTITIS EXTERNA OF RIGHT EAR, UNSPECIFIED CHRONICITY, UNSPECIFIED TYPE: Primary | ICD-10-CM

## 2023-09-24 PROCEDURE — 99283 EMERGENCY DEPT VISIT LOW MDM: CPT

## 2023-09-24 PROCEDURE — 99284 EMERGENCY DEPT VISIT MOD MDM: CPT | Mod: GF | Performed by: NURSE PRACTITIONER

## 2023-09-24 RX ORDER — CIPROFLOXACIN AND DEXAMETHASONE 3; 1 MG/ML; MG/ML
4 SUSPENSION/ DROPS AURICULAR (OTIC) 2 TIMES DAILY
Qty: 7.5 ML | Refills: 0 | Status: SHIPPED | OUTPATIENT
Start: 2023-09-24 | End: 2023-09-25

## 2023-09-24 NOTE — ED PROVIDER NOTES
Encounter Date: 9/24/2023       History     Chief Complaint   Patient presents with    Otalgia     Right ear pain since Monday     Patient presents to the ED with complaints of right ear pain. Reports he has been having trouble with this ear for several weeks and has been seen and treated by his PCP. Denies any fever.     The history is provided by the patient.     Review of patient's allergies indicates:   Allergen Reactions    Opioids - morphine analogues Hives and Rash     Only morphine     Past Medical History:   Diagnosis Date    Asthma     Diabetes mellitus, type 2     Hypertension      History reviewed. No pertinent surgical history.  History reviewed. No pertinent family history.  Social History     Tobacco Use    Smoking status: Never     Passive exposure: Never    Smokeless tobacco: Never   Substance Use Topics    Alcohol use: Not Currently    Drug use: Never     Review of Systems   Constitutional: Negative.    HENT:  Positive for ear pain.    Respiratory: Negative.     Cardiovascular: Negative.    Musculoskeletal: Negative.    Neurological: Negative.    Psychiatric/Behavioral: Negative.     All other systems reviewed and are negative.      Physical Exam     Initial Vitals [09/24/23 1529]   BP Pulse Resp Temp SpO2   (!) 157/79 62 18 98.2 °F (36.8 °C) 96 %      MAP       --         Physical Exam    Vitals reviewed.  Constitutional: He appears well-developed and well-nourished.   HENT:   Right Ear: There is drainage. Tympanic membrane is erythematous.   Left Ear: Hearing normal.   Cardiovascular:  Normal rate, regular rhythm, normal heart sounds and intact distal pulses.           Pulmonary/Chest: Breath sounds normal.   Musculoskeletal:         General: Normal range of motion.     Neurological: He is alert and oriented to person, place, and time. He has normal strength. GCS score is 15. GCS eye subscore is 4. GCS verbal subscore is 5. GCS motor subscore is 6.   Skin: Skin is warm and dry. Capillary refill  takes less than 2 seconds.   Psychiatric: He has a normal mood and affect. His behavior is normal. Judgment and thought content normal.         Medical Screening Exam   See Full Note    ED Course   Procedures  Labs Reviewed - No data to display       Imaging Results    None          Medications - No data to display  Medical Decision Making  MDM    Patient presents for emergent evaluation of acute ear pain that poses a threat to life and/or bodily function.    In the ED patient found to have acute right otitis externa.      Discharge MDM  I discussed the treatment and discharge plan with the patient.   Patient was discharged in stable condition.  Detailed return precautions discussed.     Risk  Prescription drug management.                               Clinical Impression:   Final diagnoses:  [H60.91] Otitis externa of right ear, unspecified chronicity, unspecified type (Primary)        ED Disposition Condition    Discharge Stable          ED Prescriptions       Medication Sig Dispense Start Date End Date Auth. Provider    ciprofloxacin-dexAMETHasone 0.3-0.1% (CIPRODEX) 0.3-0.1 % DrpS Place 4 drops into both ears 2 (two) times daily. 7.5 mL 9/24/2023 -- Tamera Gong FNP          Follow-up Information    None          Tamera Gong FNP  09/24/23 1555

## 2023-09-25 ENCOUNTER — OFFICE VISIT (OUTPATIENT)
Dept: FAMILY MEDICINE | Facility: CLINIC | Age: 48
End: 2023-09-25
Payer: MEDICARE

## 2023-09-25 VITALS
WEIGHT: 315 LBS | SYSTOLIC BLOOD PRESSURE: 150 MMHG | HEIGHT: 71 IN | BODY MASS INDEX: 44.1 KG/M2 | DIASTOLIC BLOOD PRESSURE: 74 MMHG | HEART RATE: 53 BPM

## 2023-09-25 DIAGNOSIS — H60.391 OTHER INFECTIVE ACUTE OTITIS EXTERNA OF RIGHT EAR: Primary | ICD-10-CM

## 2023-09-25 PROBLEM — H60.501 ACUTE OTITIS EXTERNA OF RIGHT EAR: Status: ACTIVE | Noted: 2023-09-25

## 2023-09-25 PROCEDURE — 99214 PR OFFICE/OUTPT VISIT, EST, LEVL IV, 30-39 MIN: ICD-10-PCS | Mod: ,,, | Performed by: FAMILY MEDICINE

## 2023-09-25 PROCEDURE — 99214 OFFICE O/P EST MOD 30 MIN: CPT | Mod: ,,, | Performed by: FAMILY MEDICINE

## 2023-09-25 RX ORDER — NEOMYCIN SULFATE, POLYMYXIN B SULFATE AND HYDROCORTISONE 10; 3.5; 1 MG/ML; MG/ML; [USP'U]/ML
3 SUSPENSION/ DROPS AURICULAR (OTIC) 3 TIMES DAILY
Qty: 10 ML | Refills: 0 | Status: SHIPPED | OUTPATIENT
Start: 2023-09-25 | End: 2023-10-02

## 2023-09-25 NOTE — PROGRESS NOTES
"              Ace Ordaz IV, DO  [facility] [place]  [address], [place] [address]  Phone: [phone]      Subjective     Name: Ubaldo Quach   Sex: male  YOB: 1975 (48 y.o.)  MRN: [id number]  Visit Date: 09/25/2023   Chief Complaint: Otalgia (Pt c/o right ear pain and fullness)        HISTORY OF PRESENT ILLNESS:    Chief Complaint   Patient presents with    Otalgia     Pt c/o right ear pain and fullness       Patient has 1 acute illness with systemic symptoms.  Prescription medication management was done today.  See tests that keep you healthy and the problem oriented documentation below.    Tests to Keep You Healthy    Colon Cancer Screening: DUE      Portions of this note may have been created with voice recognition software. Occasional "wrong-word" or "sound-a-like" substitutions may have occurred due to the inherent limitations of voice recognition software. Please, read the note carefully and recognize, using context, where substitutions have occurred.     PAST MEDICAL HISTORY:  Significant Diagnoses - Patient  has a past medical history of Asthma, Diabetes mellitus, type 2, and Hypertension.  Medications - Patient has a current medication list which includes the following long-term medication(s): albuterol, gabapentin, metformin, and omeprazole.   Allergies - Patient is allergic to opioids - morphine analogues.  Surgeries - Patient  has no past surgical history on file.  Family History - Patient family history is not on file.      SOCIAL HISTORY:  Tobacco - Patient  reports that he has never smoked. He has never been exposed to tobacco smoke. He has never used smokeless tobacco.   Alcohol - Patient  reports that he does not currently use alcohol.   Recreational Drugs - Patient  reports no history of drug use.       Review of Systems   All other systems reviewed and are negative.       Past Medical History:   Diagnosis Date    Asthma     Diabetes mellitus, type 2     " "Hypertension         Review of patient's allergies indicates:   Allergen Reactions    Opioids - morphine analogues Hives and Rash     Only morphine        History reviewed. No pertinent surgical history.     History reviewed. No pertinent family history.    Current Outpatient Medications   Medication Instructions    albuterol (PROVENTIL HFA) 90 mcg/actuation inhaler 1-2 puffs, Inhalation, Every 4 hours PRN, Rescue    gabapentin (NEURONTIN) 300 mg, Oral, Nightly    HYDROcodone-acetaminophen (NORCO)  mg per tablet 1 tablet, Oral, 3 times daily    linaCLOtide (LINZESS) 145 mcg, Oral, Before breakfast, Take on an empty stomach 30 minutes prior to eating.    metFORMIN (GLUCOPHAGE) 500 mg, Oral, With breakfast    neomycin-polymyxin-hydrocortisone (CORTISPORIN) 3.5-10,000-1 mg/mL-unit/mL-% otic suspension 3 drops, Right Ear, 3 times daily    omeprazole (PRILOSEC) 40 mg, Oral, 2 times daily before meals        Objective     BP (!) 150/74   Pulse (!) 53   Ht 5' 11" (1.803 m)   Wt (!) 173.7 kg (383 lb)   BMI 53.42 kg/m²     Physical Exam  Constitutional:       General: He is not in acute distress.     Appearance: Normal appearance. He is not ill-appearing.   HENT:      Head: Normocephalic and atraumatic.      Right Ear: External ear normal.      Left Ear: External ear normal.      Nose: Nose normal.   Eyes:      Extraocular Movements: Extraocular movements intact.   Cardiovascular:      Rate and Rhythm: Normal rate.   Pulmonary:      Effort: Pulmonary effort is normal.   Abdominal:      Palpations: Abdomen is soft.   Musculoskeletal:      Cervical back: Normal range of motion. No tenderness.   Neurological:      Mental Status: He is alert.   Psychiatric:         Mood and Affect: Mood normal.         Behavior: Behavior normal.        All recently obtained labs have been reviewed and discussed in detail with the patient.   Assessment     1. Other infective acute otitis externa of right ear         Plan      "   Problem List Items Addressed This Visit          ENT    Acute otitis externa of right ear - Primary     Patient was unable to afford medications prescribed to from the emergency department with the weekend.  Recommend Cortisporin at this time which is approximately 1/3 of the price.  Patient states this would be adequate.      On exam today tympanic membrane is visualized with good cone of light and is still intact.  There is some scattered cerumen but definitely erythema.              No follow-ups on file.    Patient advised that is symptoms worsen, they should call or report directly to local emergency department.    Ace Ordaz,   [facility]

## 2023-09-25 NOTE — ASSESSMENT & PLAN NOTE
Patient was unable to afford medications prescribed to from the emergency department with the weekend.  Recommend Cortisporin at this time which is approximately 1/3 of the price.  Patient states this would be adequate.      On exam today tympanic membrane is visualized with good cone of light and is still intact.  There is some scattered cerumen but definitely erythema.

## 2023-09-26 ENCOUNTER — OFFICE VISIT (OUTPATIENT)
Dept: OTOLARYNGOLOGY | Facility: CLINIC | Age: 48
End: 2023-09-26
Payer: MEDICARE

## 2023-09-26 VITALS — HEIGHT: 71 IN | BODY MASS INDEX: 44.1 KG/M2 | WEIGHT: 315 LBS

## 2023-09-26 DIAGNOSIS — R09.81 CHRONIC NASAL CONGESTION: ICD-10-CM

## 2023-09-26 DIAGNOSIS — H61.21 HEARING LOSS DUE TO CERUMEN IMPACTION, RIGHT: Primary | ICD-10-CM

## 2023-09-26 DIAGNOSIS — H60.311 DIFFUSE OTITIS EXTERNA OF RIGHT EAR, UNSPECIFIED CHRONICITY: ICD-10-CM

## 2023-09-26 PROCEDURE — 69210 REMOVE IMPACTED EAR WAX UNI: CPT | Mod: S$PBB,,, | Performed by: OTOLARYNGOLOGY

## 2023-09-26 PROCEDURE — 99213 OFFICE O/P EST LOW 20 MIN: CPT | Mod: PBBFAC | Performed by: OTOLARYNGOLOGY

## 2023-09-26 PROCEDURE — 99204 PR OFFICE/OUTPT VISIT, NEW, LEVL IV, 45-59 MIN: ICD-10-PCS | Mod: S$PBB,25,, | Performed by: OTOLARYNGOLOGY

## 2023-09-26 PROCEDURE — 99204 OFFICE O/P NEW MOD 45 MIN: CPT | Mod: S$PBB,25,, | Performed by: OTOLARYNGOLOGY

## 2023-09-26 PROCEDURE — 69210 PR REMOVAL IMPACTED CERUMEN REQUIRING INSTRUMENTATION, UNILATERAL: ICD-10-PCS | Mod: S$PBB,,, | Performed by: OTOLARYNGOLOGY

## 2023-09-26 PROCEDURE — 69210 REMOVE IMPACTED EAR WAX UNI: CPT | Mod: PBBFAC,RT | Performed by: OTOLARYNGOLOGY

## 2023-10-11 ENCOUNTER — OFFICE VISIT (OUTPATIENT)
Dept: OTOLARYNGOLOGY | Facility: CLINIC | Age: 48
End: 2023-10-11
Payer: MEDICARE

## 2023-10-11 VITALS — BODY MASS INDEX: 52.48 KG/M2 | WEIGHT: 315 LBS | HEIGHT: 65 IN

## 2023-10-11 DIAGNOSIS — H65.21 RIGHT CHRONIC SEROUS OTITIS MEDIA: ICD-10-CM

## 2023-10-11 DIAGNOSIS — H61.21 HEARING LOSS DUE TO CERUMEN IMPACTION, RIGHT: ICD-10-CM

## 2023-10-11 DIAGNOSIS — H60.311 DIFFUSE OTITIS EXTERNA OF RIGHT EAR, UNSPECIFIED CHRONICITY: Primary | ICD-10-CM

## 2023-10-11 PROCEDURE — 69210 PR REMOVAL IMPACTED CERUMEN REQUIRING INSTRUMENTATION, UNILATERAL: ICD-10-PCS | Mod: S$PBB,,, | Performed by: OTOLARYNGOLOGY

## 2023-10-11 PROCEDURE — 69210 REMOVE IMPACTED EAR WAX UNI: CPT | Mod: PBBFAC,RT | Performed by: OTOLARYNGOLOGY

## 2023-10-11 PROCEDURE — 99213 OFFICE O/P EST LOW 20 MIN: CPT | Mod: PBBFAC | Performed by: OTOLARYNGOLOGY

## 2023-10-11 PROCEDURE — 99214 PR OFFICE/OUTPT VISIT, EST, LEVL IV, 30-39 MIN: ICD-10-PCS | Mod: 25,S$PBB,, | Performed by: OTOLARYNGOLOGY

## 2023-10-11 PROCEDURE — 69210 REMOVE IMPACTED EAR WAX UNI: CPT | Mod: S$PBB,,, | Performed by: OTOLARYNGOLOGY

## 2023-10-11 PROCEDURE — 99214 OFFICE O/P EST MOD 30 MIN: CPT | Mod: 25,S$PBB,, | Performed by: OTOLARYNGOLOGY

## 2023-10-11 RX ORDER — CIPROFLOXACIN 500 MG/1
500 TABLET ORAL 2 TIMES DAILY
Qty: 20 TABLET | Refills: 0 | Status: SHIPPED | OUTPATIENT
Start: 2023-10-11

## 2023-10-11 NOTE — PROGRESS NOTES
Subjective:       Patient ID: Ubaldo Quach is a 48 y.o. male.    Chief Complaint: Otalgia (Earache, cerumen impaction and sinusitis  )    Otalgia   Associated symptoms include hearing loss.     Review of Systems   HENT:  Positive for ear pain and hearing loss.    All other systems reviewed and are negative.      Objective:      Physical Exam  General: NAD  Head: Normocephalic, atraumatic, no facial asymmetry/normal strength,  Ears: Both auricules normal in appearance, w/o deformities tympanic membranes  fluid right external auditory canals  cerumen impaction right   Nose: External nose w/o deformities normal turbinates no drainage or inflammation  Oral Cavity: Lips, gums, floor of mouth, tongue hard palate, and buccal mucosa without mass/lesion  Oropharynx: Mucosa pink and moist, soft palate, posterior pharynx and oropharyngeal wall without mass/lesion  Neck: Supple, symmetric, trachea midline, no palpable mass/lesion, no palpable cervical lymphadenopathy  Skin: Warm and dry, no concerning lesions  Respiratory: Respirations even, unlabored     Procedure: Binocular microscopy with removal of cerumen impaction using microsurgical instrumentation.  After explaining the procedure and obtaining verbal assent, the right external auditory canal visualized with the 250 mm focal length lens through the operating microscope. The obstructing cerumen was removed with microsurgical instrumentation to reveal normal and healthy external auditory canal. The patient tolerated this procedure well without complication.     Assessment:       1. Diffuse otitis externa of right ear, unspecified chronicity    2. Hearing loss due to cerumen impaction, right    3. Right chronic serous otitis media        Plan:       Cipro for ANA   F/u 2 weeks

## 2023-11-27 ENCOUNTER — OFFICE VISIT (OUTPATIENT)
Dept: OTOLARYNGOLOGY | Facility: CLINIC | Age: 48
End: 2023-11-27
Payer: MEDICARE

## 2023-11-27 DIAGNOSIS — H60.311 DIFFUSE OTITIS EXTERNA OF RIGHT EAR, UNSPECIFIED CHRONICITY: Primary | ICD-10-CM

## 2023-11-27 DIAGNOSIS — H65.21 RIGHT CHRONIC SEROUS OTITIS MEDIA: ICD-10-CM

## 2023-11-27 DIAGNOSIS — H61.21 HEARING LOSS DUE TO CERUMEN IMPACTION, RIGHT: ICD-10-CM

## 2023-11-27 DIAGNOSIS — H90.2 CONDUCTIVE HEARING LOSS, UNSPECIFIED LATERALITY: ICD-10-CM

## 2023-11-27 PROCEDURE — 99212 OFFICE O/P EST SF 10 MIN: CPT | Mod: PBBFAC | Performed by: OTOLARYNGOLOGY

## 2023-11-27 PROCEDURE — 69210 REMOVE IMPACTED EAR WAX UNI: CPT | Mod: PBBFAC,RT | Performed by: OTOLARYNGOLOGY

## 2023-11-27 PROCEDURE — 99214 PR OFFICE/OUTPT VISIT, EST, LEVL IV, 30-39 MIN: ICD-10-PCS | Mod: 25,S$PBB,, | Performed by: OTOLARYNGOLOGY

## 2023-11-27 PROCEDURE — 69210 PR REMOVAL IMPACTED CERUMEN REQUIRING INSTRUMENTATION, UNILATERAL: ICD-10-PCS | Mod: S$PBB,,, | Performed by: OTOLARYNGOLOGY

## 2023-11-27 PROCEDURE — 99214 OFFICE O/P EST MOD 30 MIN: CPT | Mod: 25,S$PBB,, | Performed by: OTOLARYNGOLOGY

## 2023-11-27 PROCEDURE — 69210 REMOVE IMPACTED EAR WAX UNI: CPT | Mod: S$PBB,,, | Performed by: OTOLARYNGOLOGY

## 2023-11-27 RX ORDER — CIPROFLOXACIN 500 MG/1
500 TABLET ORAL 2 TIMES DAILY
Qty: 20 TABLET | Refills: 0 | Status: SHIPPED | OUTPATIENT
Start: 2023-11-27

## 2023-11-27 NOTE — PROGRESS NOTES
Subjective:       Patient ID: Ubaldo Quach is a 48 y.o. male.    Chief Complaint: No chief complaint on file.  Right ear stopped up again better on cipro but returned   HPI  Review of Systems   HENT:  Positive for ear pain and hearing loss.    All other systems reviewed and are negative.      Objective:      Physical Exam  General: NAD  Head: Normocephalic, atraumatic, no facial asymmetry/normal strength,  Ears: Both auricules normal in appearance, w/o deformities tympanic membranes fluid right  external auditory canals wax impaction cleared under scope on right   Nose: External nose w/o deformities normal turbinates no drainage or inflammation  Oral Cavity: Lips, gums, floor of mouth, tongue hard palate, and buccal mucosa without mass/lesion  Oropharynx: Mucosa pink and moist, soft palate, posterior pharynx and oropharyngeal wall without mass/lesion  Neck: Supple, symmetric, trachea midline, no palpable mass/lesion, no palpable cervical lymphadenopathy  Skin: Warm and dry, no concerning lesions  Respiratory: Respirations even, unlabored    Procedure: Binocular microscopy with removal of cerumen impaction using microsurgical instrumentation.  After explaining the procedure and obtaining verbal assent,  the right  external auditory canal visualized with the 250 mm focal length lens through the operating microscope. The obstructing cerumen was removed with microsurgical instrumentation to reveal normal and healthy external auditory canal. The patient tolerated this procedure well without complication.    Assessment:       1. Diffuse otitis externa of right ear, unspecified chronicity    2. Right chronic serous otitis media    3. Conductive hearing loss, unspecified laterality    4. Hearing loss due to cerumen impaction, right        Plan:       Cipro may need tube for fluid

## 2024-02-12 ENCOUNTER — HOSPITAL ENCOUNTER (OUTPATIENT)
Dept: RADIOLOGY | Facility: HOSPITAL | Age: 49
Discharge: HOME | End: 2024-02-12
Attending: NURSE PRACTITIONER
Payer: MEDICARE

## 2024-02-12 DIAGNOSIS — R10.13 ABDOMINAL PAIN, EPIGASTRIC: ICD-10-CM

## 2024-02-12 DIAGNOSIS — R10.13 ABDOMINAL PAIN, EPIGASTRIC: Primary | ICD-10-CM

## 2024-02-12 PROCEDURE — 71046 X-RAY EXAM CHEST 2 VIEWS: CPT | Mod: TC

## 2024-03-07 DIAGNOSIS — M25.561 ACUTE PAIN OF RIGHT KNEE: Primary | ICD-10-CM

## 2024-03-08 ENCOUNTER — OFFICE VISIT (OUTPATIENT)
Dept: ORTHOPEDICS | Facility: CLINIC | Age: 49
End: 2024-03-08
Payer: MEDICARE

## 2024-03-08 ENCOUNTER — HOSPITAL ENCOUNTER (OUTPATIENT)
Dept: RADIOLOGY | Facility: HOSPITAL | Age: 49
Discharge: HOME | End: 2024-03-08
Payer: MEDICARE

## 2024-03-08 VITALS — WEIGHT: 315 LBS | BODY MASS INDEX: 52.48 KG/M2 | HEIGHT: 65 IN

## 2024-03-08 DIAGNOSIS — M25.561 ACUTE PAIN OF RIGHT KNEE: ICD-10-CM

## 2024-03-08 DIAGNOSIS — M25.561 CHRONIC PAIN OF RIGHT KNEE: Primary | ICD-10-CM

## 2024-03-08 DIAGNOSIS — G89.29 CHRONIC PAIN OF RIGHT KNEE: Primary | ICD-10-CM

## 2024-03-08 PROCEDURE — 99203 OFFICE O/P NEW LOW 30 MIN: CPT | Mod: S$PBB,25,,

## 2024-03-08 PROCEDURE — 73562 X-RAY EXAM OF KNEE 3: CPT | Mod: TC,RT

## 2024-03-08 PROCEDURE — 73562 X-RAY EXAM OF KNEE 3: CPT | Mod: 26,RT,, | Performed by: RADIOLOGY

## 2024-03-08 PROCEDURE — 20610 DRAIN/INJ JOINT/BURSA W/O US: CPT | Mod: PBBFAC,RT

## 2024-03-08 PROCEDURE — 99213 OFFICE O/P EST LOW 20 MIN: CPT | Mod: PBBFAC,25

## 2024-03-08 PROCEDURE — 99999PBSHW PR PBB SHADOW TECHNICAL ONLY FILED TO HB: Mod: JW,PBBFAC,,

## 2024-03-08 RX ORDER — BUPIVACAINE HYDROCHLORIDE 2.5 MG/ML
1 INJECTION, SOLUTION EPIDURAL; INFILTRATION; INTRACAUDAL
Status: DISCONTINUED | OUTPATIENT
Start: 2024-03-08 | End: 2024-03-08 | Stop reason: HOSPADM

## 2024-03-08 RX ORDER — MELOXICAM 15 MG/1
15 TABLET ORAL DAILY
Qty: 30 TABLET | Refills: 2 | Status: SHIPPED | OUTPATIENT
Start: 2024-03-08 | End: 2024-06-06

## 2024-03-08 RX ORDER — TRIAMCINOLONE ACETONIDE 40 MG/ML
40 INJECTION, SUSPENSION INTRA-ARTICULAR; INTRAMUSCULAR
Status: DISCONTINUED | OUTPATIENT
Start: 2024-03-08 | End: 2024-03-08 | Stop reason: HOSPADM

## 2024-03-08 RX ADMIN — TRIAMCINOLONE ACETONIDE 40 MG: 40 INJECTION, SUSPENSION INTRA-ARTICULAR; INTRAMUSCULAR at 10:03

## 2024-03-08 RX ADMIN — BUPIVACAINE HYDROCHLORIDE 1 ML: 2.5 INJECTION, SOLUTION EPIDURAL; INFILTRATION; INTRACAUDAL at 10:03

## 2024-03-08 NOTE — PROGRESS NOTES
CC:   Chief Complaint   Patient presents with    Right Knee - Pain          Ubalod Quach is a 48 y.o. male seen today for Pain of the Right Knee  Patient reports a fall approximately 2 years ago that resulted in a knee pain that got better with time, he also reports another knee injury approximately 3 months ago when he fell on his knee.  He denies any swelling after injury.  He reports a pain that worsens after activities and we will keep him up at night.  He reports feeling a popping grinding since in his knee with movement.  He denies any gait instability.  No other complaints today.        PAST MEDICAL HISTORY:   Past Medical History:   Diagnosis Date    Asthma     Diabetes mellitus, type 2     Hypertension           PAST SURGICAL HISTORY: History reviewed. No pertinent surgical history.       ALLERGIES:   Review of patient's allergies indicates:   Allergen Reactions    Opioids - morphine analogues Hives and Rash     Only morphine        MEDICATIONS :    Current Outpatient Medications:     albuterol (PROVENTIL HFA) 90 mcg/actuation inhaler, Inhale 1-2 puffs into the lungs every 4 (four) hours as needed for Wheezing. Rescue, Disp: 18 g, Rfl: 0    ciprofloxacin HCl (CIPRO) 500 MG tablet, Take 1 tablet (500 mg total) by mouth 2 (two) times daily., Disp: 20 tablet, Rfl: 0    ciprofloxacin HCl (CIPRO) 500 MG tablet, Take 1 tablet (500 mg total) by mouth 2 (two) times a day., Disp: 20 tablet, Rfl: 0    gabapentin (NEURONTIN) 300 MG capsule, Take 300 mg by mouth every evening., Disp: , Rfl:     HYDROcodone-acetaminophen (NORCO)  mg per tablet, Take 1 tablet by mouth 3 (three) times daily., Disp: , Rfl:     linaCLOtide (LINZESS) 145 mcg Cap capsule, Take 1 capsule (145 mcg total) by mouth before breakfast. Take on an empty stomach 30 minutes prior to eating., Disp: 90 capsule, Rfl: 1    metFORMIN (GLUCOPHAGE) 500 MG tablet, Take 500 mg by mouth daily with breakfast., Disp: , Rfl:      SOCIAL  HISTORY:   Social History     Socioeconomic History    Marital status:    Tobacco Use    Smoking status: Never     Passive exposure: Never    Smokeless tobacco: Never   Substance and Sexual Activity    Alcohol use: Not Currently    Drug use: Never    Sexual activity: Never        FAMILY HISTORY: History reviewed. No pertinent family history.       PHYSICAL EXAM:      There were no vitals filed for this visit.  Body mass index is 63.4 kg/m².    GENERAL: Well-developed, well-nourished male . The patient is alert, oriented and cooperative.    HEENT:  Normocephalic, atraumatic.  Extraocular movements are intact bilaterally.     NECK:  Nontender with good range of motion.    LUNGS:  Clear to auscultation bilaterally.    HEART:  Regular rate and rhythm.     ABDOMEN:  Soft, non-tender, non-distended.      EXTREMITIES:  Right knee was skin clean dry and intact, no soft tissue swelling on exam, reduced range of motion from 0 to only about 90° due to pain, knee is stable to varus and valgus stress testing, neurovascularly intact      RADIOGRAPHIC FINDINGS:   X-Ray Knee AP LAT with Sunrise Right    Result Date: 3/8/2024  EXAMINATION: XR KNEE AP LAT WITH SUNRISE RIGHT CLINICAL HISTORY: Pain in right knee TECHNIQUE: AP, lateral, sunrise right knee radiograph COMPARISON: 02/12/2017 FINDINGS: There is mild tricompartmental osteoarthritic changes with osteophyte formation.  No acute fracture or dislocation.     Tricompartmental osteoarthritic change. Electronically signed by: Laith Lloyd Date:    03/08/2024 Time:    10:18       Patient Active Problem List    Diagnosis Date Noted    Acute otitis externa of right ear 09/25/2023    Morbid obesity 06/27/2023    Chronic RUQ pain 06/27/2023    Fatty liver 06/27/2023    Hyperglycemia 06/27/2023     IMPRESSION AND PLAN:  Chronic right knee pain.  Personally reviewed x-rays today with mild tricompartmental DJD changes as well as osteophyte formation specifically on the lateral and  medial edges of the patella.  Discussed all treatment options with the patient.  Discussed possible steroid injection, see procedural note for details.  Discussed that these can be repeated every 3-4 months if need needed.  Discussed possible further imaging, MRI, if his pain continues.  Follow up p.r.n..    No follow-ups on file.       Alba Julien PA-C      (Subject to voice recognition error, transcription service not allowed)

## 2024-03-08 NOTE — PROCEDURES
Large Joint Aspiration/Injection: R knee    Date/Time: 3/8/2024 10:00 AM    Performed by: Alba Julien PA  Authorized by: Alba Julien PA    Consent Done?:  Yes (Verbal)  Indications:  Arthritis and pain  Site marked: the procedure site was marked      Details:  Needle Size:  22 G  Approach:  Anterolateral  Location:  Knee  Site:  R knee  Medications:  1 mL BUPivacaine (PF) 0.25% (2.5 mg/ml) 0.25 % (2.5 mg/mL); 40 mg triamcinolone acetonide 40 mg/mL  Patient tolerance:  Patient tolerated the procedure well with no immediate complications

## 2024-05-09 ENCOUNTER — OFFICE VISIT (OUTPATIENT)
Dept: ORTHOPEDICS | Facility: CLINIC | Age: 49
End: 2024-05-09
Payer: MEDICARE

## 2024-05-09 DIAGNOSIS — M25.561 CHRONIC PAIN OF RIGHT KNEE: Primary | ICD-10-CM

## 2024-05-09 DIAGNOSIS — G89.29 CHRONIC PAIN OF RIGHT KNEE: Primary | ICD-10-CM

## 2024-05-09 PROCEDURE — 99213 OFFICE O/P EST LOW 20 MIN: CPT | Mod: PBBFAC

## 2024-05-09 PROCEDURE — 99213 OFFICE O/P EST LOW 20 MIN: CPT | Mod: S$PBB,,,

## 2024-05-09 NOTE — PROGRESS NOTES
CC:   Chief Complaint   Patient presents with    Right Knee - Follow-up          Ubaldo Quach is a 49 y.o. male seen today for follow up of Follow-up of the Right Knee  Patient last seen on 03/08/2024 where he received a steroid injection of the right knee.  Patient states he received a few weeks if pain relief however as he is continued to work the pain has returned and worsened.  He is also still experiencing swelling of the right knee.  He has pain with weight-bearing.  No new falls or injury.  No other complaints today.        PAST MEDICAL HISTORY:   Past Medical History:   Diagnosis Date    Asthma     Diabetes mellitus, type 2     Hypertension         PAST SURGICAL HISTORY: History reviewed. No pertinent surgical history.     ALLERGIES:   Review of patient's allergies indicates:   Allergen Reactions    Opioids - morphine analogues Hives and Rash     Only morphine        MEDICATIONS :    Current Outpatient Medications:     albuterol (PROVENTIL HFA) 90 mcg/actuation inhaler, Inhale 1-2 puffs into the lungs every 4 (four) hours as needed for Wheezing. Rescue, Disp: 18 g, Rfl: 0    ciprofloxacin HCl (CIPRO) 500 MG tablet, Take 1 tablet (500 mg total) by mouth 2 (two) times daily., Disp: 20 tablet, Rfl: 0    ciprofloxacin HCl (CIPRO) 500 MG tablet, Take 1 tablet (500 mg total) by mouth 2 (two) times a day., Disp: 20 tablet, Rfl: 0    gabapentin (NEURONTIN) 300 MG capsule, Take 300 mg by mouth every evening., Disp: , Rfl:     HYDROcodone-acetaminophen (NORCO)  mg per tablet, Take 1 tablet by mouth 3 (three) times daily., Disp: , Rfl:     linaCLOtide (LINZESS) 145 mcg Cap capsule, Take 1 capsule (145 mcg total) by mouth before breakfast. Take on an empty stomach 30 minutes prior to eating., Disp: 90 capsule, Rfl: 1    meloxicam (MOBIC) 15 MG tablet, Take 1 tablet (15 mg total) by mouth once daily., Disp: 30 tablet, Rfl: 2    metFORMIN (GLUCOPHAGE) 500 MG tablet, Take 500 mg by mouth daily with  breakfast., Disp: , Rfl:      SOCIAL HISTORY:   Social History     Socioeconomic History    Marital status:    Tobacco Use    Smoking status: Never     Passive exposure: Never    Smokeless tobacco: Never   Substance and Sexual Activity    Alcohol use: Not Currently    Drug use: Never    Sexual activity: Never        FAMILY HISTORY: No family history on file.       PHYSICAL EXAM:      There were no vitals filed for this visit.  There is no height or weight on file to calculate BMI.    GENERAL: Well-developed, well-nourished male . The patient is alert, oriented and cooperative.    EXTREMITIES:  Right knee with skin clean dry and intact, no swelling or joint effusion appreciated on exam today, mild tendernes at medial and lateral joint lines, range of motion from 0-100 degrees but not without pain, knee is stable to varus and valgus stress testing, positive Alicia's, neurovascularly intact      RADIOGRAPHIC FINDINGS:   No results found.     Patient Active Problem List    Diagnosis Date Noted    Acute otitis externa of right ear 09/25/2023    Morbid obesity 06/27/2023    Chronic RUQ pain 06/27/2023    Fatty liver 06/27/2023    Hyperglycemia 06/27/2023     IMPRESSION AND PLAN:  Continued right knee pain after steroid injection in March.  Discussed all treatment options with the patient today.  Discussed that he is attempted conservative management with Tylenol and NSAIDs, home exercises, and steroid injection without much relief.  We will order MRI today for further evaluation.  We will call with MRI results and schedule patient with Dr. Ga.        No follow-ups on file.       Alba Julien PA-C      (Subject to voice recognition error, transcription service not allowed)

## 2024-07-30 ENCOUNTER — OFFICE VISIT (OUTPATIENT)
Dept: ORTHOPEDICS | Facility: CLINIC | Age: 49
End: 2024-07-30
Payer: MEDICARE

## 2024-07-30 DIAGNOSIS — G89.29 CHRONIC PAIN OF RIGHT KNEE: ICD-10-CM

## 2024-07-30 DIAGNOSIS — S83.241D TEAR OF MEDIAL MENISCUS OF RIGHT KNEE, CURRENT, UNSPECIFIED TEAR TYPE, SUBSEQUENT ENCOUNTER: Primary | ICD-10-CM

## 2024-07-30 DIAGNOSIS — M25.561 CHRONIC PAIN OF RIGHT KNEE: ICD-10-CM

## 2024-07-30 PROCEDURE — 99999 PR PBB SHADOW E&M-EST. PATIENT-LVL III: CPT | Mod: PBBFAC,,,

## 2024-07-30 PROCEDURE — 99213 OFFICE O/P EST LOW 20 MIN: CPT | Mod: PBBFAC

## 2024-07-30 PROCEDURE — 99999PBSHW PR PBB SHADOW TECHNICAL ONLY FILED TO HB: Mod: PBBFAC,,,

## 2024-07-30 PROCEDURE — 20610 DRAIN/INJ JOINT/BURSA W/O US: CPT | Mod: PBBFAC

## 2024-07-30 RX ORDER — TRIAMCINOLONE ACETONIDE 40 MG/ML
40 INJECTION, SUSPENSION INTRA-ARTICULAR; INTRAMUSCULAR
Status: DISCONTINUED | OUTPATIENT
Start: 2024-07-30 | End: 2024-07-30 | Stop reason: HOSPADM

## 2024-07-30 RX ORDER — BUPIVACAINE HYDROCHLORIDE 2.5 MG/ML
1 INJECTION, SOLUTION EPIDURAL; INFILTRATION; INTRACAUDAL
Status: DISCONTINUED | OUTPATIENT
Start: 2024-07-30 | End: 2024-07-30 | Stop reason: HOSPADM

## 2024-07-30 RX ADMIN — TRIAMCINOLONE ACETONIDE 40 MG: 40 INJECTION, SUSPENSION INTRA-ARTICULAR; INTRAMUSCULAR at 01:07

## 2024-07-30 RX ADMIN — BUPIVACAINE HYDROCHLORIDE 1 ML: 2.5 INJECTION, SOLUTION EPIDURAL; INFILTRATION; INTRACAUDAL at 01:07

## 2024-07-30 NOTE — PROGRESS NOTES
CC:   Chief Complaint   Patient presents with    Right Knee - Pain          Ubaldo Quach is a 49 y.o. male seen today for follow up of Pain of the Right Knee  Patient known to me for chronic right knee pain.  Last injection of right knee given on 03/08/2024.  MRI on 05/23/2024 at [facility] with medial meniscus tear.  Patient states he is unable to go surgical repair at this time as he is caring for his father-in-law who has stomach cancer.  Patient wishes to have repeat injection and wait on surgery.  No new complaints today.        PAST MEDICAL HISTORY:   Past Medical History:   Diagnosis Date    Asthma     Diabetes mellitus, type 2     Hypertension         PAST SURGICAL HISTORY: History reviewed. No pertinent surgical history.     ALLERGIES:   Review of patient's allergies indicates:   Allergen Reactions    Opioids - morphine analogues Hives and Rash     Only morphine        MEDICATIONS :    Current Outpatient Medications:     albuterol (PROVENTIL HFA) 90 mcg/actuation inhaler, Inhale 1-2 puffs into the lungs every 4 (four) hours as needed for Wheezing. Rescue, Disp: 18 g, Rfl: 0    ciprofloxacin HCl (CIPRO) 500 MG tablet, Take 1 tablet (500 mg total) by mouth 2 (two) times daily., Disp: 20 tablet, Rfl: 0    ciprofloxacin HCl (CIPRO) 500 MG tablet, Take 1 tablet (500 mg total) by mouth 2 (two) times a day., Disp: 20 tablet, Rfl: 0    gabapentin (NEURONTIN) 300 MG capsule, Take 300 mg by mouth every evening., Disp: , Rfl:     HYDROcodone-acetaminophen (NORCO)  mg per tablet, Take 1 tablet by mouth 3 (three) times daily., Disp: , Rfl:     linaCLOtide (LINZESS) 145 mcg Cap capsule, Take 1 capsule (145 mcg total) by mouth before breakfast. Take on an empty stomach 30 minutes prior to eating., Disp: 90 capsule, Rfl: 1    metFORMIN (GLUCOPHAGE) 500 MG tablet, Take 500 mg by mouth daily with breakfast., Disp: , Rfl:      SOCIAL HISTORY:   Social History     Socioeconomic History     Marital status:    Tobacco Use    Smoking status: Never     Passive exposure: Never    Smokeless tobacco: Never   Substance and Sexual Activity    Alcohol use: Not Currently    Drug use: Never    Sexual activity: Never        FAMILY HISTORY: No family history on file.       PHYSICAL EXAM:      There were no vitals filed for this visit.  There is no height or weight on file to calculate BMI.    GENERAL: Well-developed, well-nourished male . The patient is alert, oriented and cooperative.    EXTREMITIES:  Right knee with skin clean dry intact, tenderness to palpation along medial and lateral to my, range of motion from 0-100 degrees, neurovascularly intact      RADIOGRAPHIC FINDINGS:   No results found.     Patient Active Problem List    Diagnosis Date Noted    Acute otitis externa of right ear 09/25/2023    Morbid obesity 06/27/2023    Chronic RUQ pain 06/27/2023    Fatty liver 06/27/2023    Hyperglycemia 06/27/2023     IMPRESSION AND PLAN:  Right knee pain.  Medial meniscus tear right knee.  Discussed all treatment options with the patient today.  Patient wishing or injection as he is unable to go surgical intervention at this time.  Right knee steroid injection given today, see procedural note for details.  Discussed with the patient that these can be repeated every 3 months as needed.  Discussed with the patient to call Dr. Ga's office wishes to reconsider surgical options.  Otherwise follow up p.r.n..        No follow-ups on file.       Alba Julien PA-C      (Subject to voice recognition error, transcription service not allowed)

## 2024-07-30 NOTE — PROCEDURES
Large Joint Aspiration/Injection: R knee    Date/Time: 7/30/2024 1:40 PM    Performed by: Alba Julien PA  Authorized by: Alba Julien PA    Consent Done?:  Yes (Verbal)  Indications:  Arthritis and pain  Site marked: the procedure site was marked      Details:  Needle Size:  22 G  Approach:  Anterolateral  Location:  Knee  Site:  R knee  Medications:  1 mL BUPivacaine (PF) 0.25% (2.5 mg/ml) 0.25 % (2.5 mg/mL); 40 mg triamcinolone acetonide 40 mg/mL  Patient tolerance:  Patient tolerated the procedure well with no immediate complications

## 2025-02-10 ENCOUNTER — HOSPITAL ENCOUNTER (OUTPATIENT)
Dept: RADIOLOGY | Facility: HOSPITAL | Age: 50
Discharge: HOME | End: 2025-02-10
Attending: NURSE PRACTITIONER
Payer: MEDICARE

## 2025-02-10 DIAGNOSIS — R05.1 ACUTE COUGH: ICD-10-CM

## 2025-02-10 DIAGNOSIS — R05.1 ACUTE COUGH: Primary | ICD-10-CM

## 2025-02-10 PROCEDURE — 71046 X-RAY EXAM CHEST 2 VIEWS: CPT | Mod: TC

## 2025-02-12 ENCOUNTER — TELEPHONE (OUTPATIENT)
Dept: FAMILY MEDICINE | Facility: CLINIC | Age: 50
End: 2025-02-12
Payer: MEDICARE

## 2025-02-12 NOTE — TELEPHONE ENCOUNTER
Pt called for a refill of atorvastatin but medication is not on pt current med list and pt has only seen Dr. Ordaz once in 09/25/23 informed pt that he needs an appt.

## 2025-03-11 ENCOUNTER — OFFICE VISIT (OUTPATIENT)
Dept: DIABETES SERVICES | Facility: CLINIC | Age: 50
End: 2025-03-11
Payer: MEDICARE

## 2025-03-11 VITALS
BODY MASS INDEX: 52.48 KG/M2 | DIASTOLIC BLOOD PRESSURE: 90 MMHG | HEART RATE: 61 BPM | RESPIRATION RATE: 18 BRPM | WEIGHT: 315 LBS | SYSTOLIC BLOOD PRESSURE: 138 MMHG | HEIGHT: 65 IN | OXYGEN SATURATION: 95 %

## 2025-03-11 DIAGNOSIS — E66.01 MORBID OBESITY: ICD-10-CM

## 2025-03-11 DIAGNOSIS — R73.03 PREDIABETES: Primary | ICD-10-CM

## 2025-03-11 DIAGNOSIS — I10 HYPERTENSION, UNSPECIFIED TYPE: ICD-10-CM

## 2025-03-11 LAB
GLUCOSE SERPL-MCNC: 99 MG/DL (ref 70–110)
HBA1C MFR BLD: 5.8 % (ref 4.5–6.6)

## 2025-03-11 PROCEDURE — 99999 PR PBB SHADOW E&M-EST. PATIENT-LVL IV: CPT | Mod: PBBFAC,,, | Performed by: NURSE PRACTITIONER

## 2025-03-11 PROCEDURE — 99204 OFFICE O/P NEW MOD 45 MIN: CPT | Mod: S$PBB,,, | Performed by: NURSE PRACTITIONER

## 2025-03-11 PROCEDURE — 82962 GLUCOSE BLOOD TEST: CPT | Mod: PBBFAC | Performed by: NURSE PRACTITIONER

## 2025-03-11 PROCEDURE — 99214 OFFICE O/P EST MOD 30 MIN: CPT | Mod: PBBFAC | Performed by: NURSE PRACTITIONER

## 2025-03-11 PROCEDURE — 99999PBSHW POCT GLUCOSE, HAND-HELD DEVICE: Mod: PBBFAC,,,

## 2025-03-11 PROCEDURE — 83036 HEMOGLOBIN GLYCOSYLATED A1C: CPT | Mod: PBBFAC | Performed by: NURSE PRACTITIONER

## 2025-03-11 PROCEDURE — 99999PBSHW POCT HEMOGLOBIN A1C: Mod: PBBFAC,,,

## 2025-03-11 RX ORDER — CETIRIZINE HYDROCHLORIDE 1 MG/ML
5 SOLUTION ORAL
COMMUNITY
Start: 2024-04-08

## 2025-03-11 RX ORDER — LANCETS
EACH MISCELLANEOUS
Qty: 100 EACH | Refills: 3 | Status: SHIPPED | OUTPATIENT
Start: 2025-03-11

## 2025-03-11 RX ORDER — DULOXETIN HYDROCHLORIDE 30 MG/1
30 CAPSULE, DELAYED RELEASE ORAL
COMMUNITY
Start: 2025-01-13 | End: 2025-03-14

## 2025-03-11 RX ORDER — LISINOPRIL 10 MG/1
10 TABLET ORAL DAILY
Qty: 90 TABLET | Refills: 3 | Status: SHIPPED | OUTPATIENT
Start: 2025-03-11 | End: 2026-03-11

## 2025-03-11 RX ORDER — PANTOPRAZOLE SODIUM 40 MG/1
40 TABLET, DELAYED RELEASE ORAL
COMMUNITY
Start: 2025-02-11 | End: 2025-03-11

## 2025-03-11 RX ORDER — GABAPENTIN 400 MG/1
400 CAPSULE ORAL
COMMUNITY
Start: 2024-10-21 | End: 2025-03-11

## 2025-03-11 RX ORDER — AMLODIPINE, VALSARTAN AND HYDROCHLOROTHIAZIDE 10; 160; 25 MG/1; MG/1; MG/1
1 TABLET ORAL
COMMUNITY
End: 2025-03-11

## 2025-03-11 RX ORDER — INSULIN PUMP SYRINGE, 3 ML
EACH MISCELLANEOUS
Qty: 1 EACH | Refills: 0 | Status: SHIPPED | OUTPATIENT
Start: 2025-03-11 | End: 2026-03-11

## 2025-03-11 NOTE — PROGRESS NOTES
Subjective:         Patient ID: Ubaldo Quach is a 49 y.o. male.  Patient's current PCP is Daniele Isaac FNP-C.     Chief Complaint: Diabetes Mellitus (Patient is here to establish care with new provider. Patient will receive a blood glucose check and A1C check. He states he was borderline diabetic 10 years ago and thinks he might have diabetes now. C/o blurry vision h/a , polyuria, polydipsia, polyphagia. He does not check his sugar. )    HPI  Ubaldo Quach is a 49 y.o. White male presenting for a new consult for diabetes. Patient has been prediabetic for over 10 yrs and is starting to have worsening blurred vision, HA, polydipsia and polydipsia.     Received diabetes education: N/A     CURRENT DM MEDICATIONS:   Diabetes Medications              metFORMIN (GLUCOPHAGE) 500 MG tablet Take 500 mg by mouth daily with breakfast.          Has not taken ever    Past failed treatment include: N/A     Blood glucose testing is not performed. Patient is testing 0 times per day.  Meter:   Preferred lab:    Any episodes of hypoglycemia? no     Complications related to diabetes: none    His blood sugar in the clinic today was:   Lab Results   Component Value Date    POCGLU 99 03/11/2025       Ubaldo Quach presents today for follow up visit to discuss diabetes management.   He has been a prediabetic for almost 10yrs. He was prescribed metformin but never started it.   He is concerned that his sugar is out of control and that he is a diabetic d/t the vision changes, HA's, polyuria, and polydipsia.   Discussed POCT A1C and FSBG. He verbalizes understanding on the readings.     He is following a keto, carnivore diet--- discussed medication regimens-- can start on metformin or GLP1 d/t prediabetic stage. However, he wants to try with the diet changes first.     He has been seen by ENT, opthalmology, and cardiology secondary to these issues he is having.   He does not take any daily medications. He was on BP meds in the past but has  "been off them for 6+ months.     Current diet: as above   Activity Level: light     Lab Results   Component Value Date    HGBA1C 5.8 03/11/2025    HGBA1C 5.7 06/21/2023    HGBA1C 5.6 07/07/2021       STANDARDS OF CARE  Diabetes Management Status    Statin: Not taking  ACE/ARB: Taking    Screening or Prevention Patient's value Goal Complete/Controlled?   HgA1C Testing and Control   Lab Results   Component Value Date    HGBA1C 5.8 03/11/2025      Annually/Less than 8% Yes   Lipid profile Most Recent Lipid Panel Health Maintenance Topic Completion: Not Found Annually No   LDL control Lab Results   Component Value Date    LDLCALC 134 07/07/2021    Annually/Less than 100 mg/dl  No   Nephropathy screening No results found for: "LABMICR"  Lab Results   Component Value Date    PROTEINUA Negative 06/16/2023     No results found for: "UTPCR"   Annually No   Blood pressure BP Readings from Last 1 Encounters:   03/11/25 (!) 138/90    Less than 140/90 Yes   Dilated retinal exam Most Recent Eye Exam Date: Not Found Annually No   Foot exam   Most Recent Foot Exam Date: Not Found Annually No          Labs reviewed and are noted below.    Lab Results   Component Value Date    WBC 5.64 06/16/2023    HGB 14.1 06/16/2023    HCT 42.9 06/16/2023     06/16/2023    CHOL 205 (H) 07/07/2021    TRIG 106 07/07/2021    HDL 50 07/07/2021    LDLCALC 134 07/07/2021    ALT 25 06/21/2023    AST 16 06/21/2023     06/16/2023    K 4.2 06/16/2023     06/16/2023    ANIONGAP 14 06/16/2023    CREATININE 1.10 06/16/2023    EGFRNONAA 67 07/08/2022    BUN 17 06/16/2023    CO2 27 06/16/2023    TSH 2.200 07/07/2021     06/16/2023     Lab Results   Component Value Date    TSH 2.200 07/07/2021    TOTALTESTOST 426.0 07/14/2021    CALCIUM 8.7 06/16/2023     No results found for: "CPEPTIDE"  No results found for: "GLUTAMICACID"  Glucose   Date Value Ref Range Status   06/16/2023 106 74 - 106 mg/dL Final     Anion Gap   Date Value Ref Range " Status   06/16/2023 14 7 - 16 mmol/L Final     eGFR   Date Value Ref Range Status   07/08/2022 67 >=60 mL/min/1.73m² Final       The following portions of the patient's history were reviewed and updated as appropriate: allergies, current medications, past family history, past medical history, past social history, past surgical history, and problem list.    Review of patient's allergies indicates:   Allergen Reactions    Morphine Itching    Opioids - morphine analogues Hives and Rash     Only morphine     Social History[1]  Past Medical History:   Diagnosis Date    Asthma     Diabetes mellitus, type 2     Hypertension        REVIEW OF SYSTEMS:  Eyes No history of DR.  Cardiovascular: History of HTN   GI: Denies nausea,vomiting,constipation,or diarrhea.  : Denies dysuria.  SKIN: Denies rashes and lesions.  Neuro: No neuropathy.  PSYCH: No tobacco use.  ENDO: See HPI.        Objective:      Vitals:    03/11/25 1047   BP: (!) 138/90   Pulse: 61   Resp: 18     RESPIRATORY: No respiratory distress  NEUROLOGIC: Cranial nerves II-XII grossly intact.   PSYCHIATRIC: Alert & oriented x3. Normal mood and affect.  FOOT EXAMINATION:   Assessment:       1. Prediabetes    2. Morbid obesity    3. Hypertension, unspecified type        Plan:   Prediabetes  -     POCT Glucose, Hand-Held Device  -     Hemoglobin A1C, POCT  -     blood-glucose meter kit; To check BG 4 times daily, to use with insurance preferred meter  Dispense: 1 each; Refill: 0  -     lancets Misc; To check BG 4 times daily, to use with insurance preferred meter  Dispense: 100 each; Refill: 3  -     blood sugar diagnostic Strp; To check BG 4 times daily, to use with insurance preferred meter  Dispense: 100 each; Refill: 3    Morbid obesity  - noted   - diet and lifestyle changes discussed     Hypertension, unspecified type  -     lisinopriL 10 MG tablet; Take 1 tablet (10 mg total) by mouth once daily.  Dispense: 90 tablet; Refill: 3        - Follow up:  as needed  "      Portions of this note may have been created with voice recognition software. Occasional "wrong-word" or "sound-a-like" substitutions may have occurred due to the inherent limitations of voice recognition software. Please, read the note carefully and recognize, using context, where substitutions have occurred.         Laura ESTRELLA/CLAY  [facility]          [1]   Social History  Socioeconomic History    Marital status:    Tobacco Use    Smoking status: Never     Passive exposure: Never    Smokeless tobacco: Never   Substance and Sexual Activity    Alcohol use: Not Currently    Drug use: Never    Sexual activity: Never     "

## 2025-03-17 ENCOUNTER — OFFICE VISIT (OUTPATIENT)
Dept: ORTHOPEDICS | Facility: CLINIC | Age: 50
End: 2025-03-17
Payer: MEDICARE

## 2025-03-17 VITALS — WEIGHT: 315 LBS | HEIGHT: 65 IN | BODY MASS INDEX: 52.48 KG/M2

## 2025-03-17 DIAGNOSIS — S83.241D TEAR OF MEDIAL MENISCUS OF RIGHT KNEE, CURRENT, UNSPECIFIED TEAR TYPE, SUBSEQUENT ENCOUNTER: Primary | ICD-10-CM

## 2025-03-17 PROCEDURE — 20610 DRAIN/INJ JOINT/BURSA W/O US: CPT | Mod: PBBFAC,RT

## 2025-03-17 PROCEDURE — 99999PBSHW PR PBB SHADOW TECHNICAL ONLY FILED TO HB: Mod: PBBFAC,,,

## 2025-03-17 PROCEDURE — 99213 OFFICE O/P EST LOW 20 MIN: CPT | Mod: PBBFAC

## 2025-03-17 PROCEDURE — 99999 PR PBB SHADOW E&M-EST. PATIENT-LVL III: CPT | Mod: PBBFAC,,,

## 2025-03-17 RX ORDER — BUPIVACAINE HYDROCHLORIDE 2.5 MG/ML
1 INJECTION, SOLUTION EPIDURAL; INFILTRATION; INTRACAUDAL; PERINEURAL
Status: DISCONTINUED | OUTPATIENT
Start: 2025-03-17 | End: 2025-03-17 | Stop reason: HOSPADM

## 2025-03-17 RX ORDER — TRIAMCINOLONE ACETONIDE 40 MG/ML
40 INJECTION, SUSPENSION INTRA-ARTICULAR; INTRAMUSCULAR
Status: DISCONTINUED | OUTPATIENT
Start: 2025-03-17 | End: 2025-03-17 | Stop reason: HOSPADM

## 2025-03-17 RX ADMIN — BUPIVACAINE HYDROCHLORIDE 1 ML: 2.5 INJECTION, SOLUTION EPIDURAL; INFILTRATION; INTRACAUDAL; PERINEURAL at 09:03

## 2025-03-17 RX ADMIN — TRIAMCINOLONE ACETONIDE 40 MG: 40 INJECTION, SUSPENSION INTRA-ARTICULAR; INTRAMUSCULAR at 09:03

## 2025-03-17 NOTE — PROGRESS NOTES
CC:   Chief Complaint   Patient presents with    Right Knee - Pain     Patient is wanting a right knee steroid injection today        Ubaldo Quach is a 49 y.o. male seen today for follow up of Pain of the Right Knee (Patient is wanting a right knee steroid injection today)  Patient last seen by me in July of 2024 where he received an injection chronic right knee pain and medial meniscus tear.  He presents today for repeat injection.  He reports he is not quite ready for surgery yet and wants another injection.  No new complaints today.        PAST MEDICAL HISTORY:   Past Medical History:   Diagnosis Date    Asthma     Diabetes mellitus, type 2     Hypertension         PAST SURGICAL HISTORY: No past surgical history on file.     ALLERGIES:   Review of patient's allergies indicates:   Allergen Reactions    Morphine Itching    Opioids - morphine analogues Hives and Rash     Only morphine        MEDICATIONS :  Current Medications[1]     SOCIAL HISTORY: Social History[2]     FAMILY HISTORY: No family history on file.       PHYSICAL EXAM:      There were no vitals filed for this visit.  Body mass index is 63.36 kg/m².    GENERAL: Well-developed, well-nourished male . The patient is alert, oriented and cooperative.    EXTREMITIES:  Right knee tenderness to palpation medial joint line, range of motion from 0-120 degrees, neurovascularly intact      RADIOGRAPHIC FINDINGS:   None today     Patient Active Problem List    Diagnosis Date Noted    Acute otitis externa of right ear 09/25/2023    Morbid obesity 06/27/2023    Chronic RUQ pain 06/27/2023    Fatty liver 06/27/2023    Hyperglycemia 06/27/2023       IMPRESSION AND PLAN:  Chronic medial meniscus tear right knee.  Repeat intra-articular steroid injection today, see procedural note for details.  Discussed follow up with Dr. Ga 3 months to discuss possible surgery.      No follow-ups on file.       Alba Julien PA-C      (Subject to voice recognition  error, transcription service not allowed)         [1]   Current Outpatient Medications:     blood sugar diagnostic Strp, To check BG 4 times daily, to use with insurance preferred meter, Disp: 100 each, Rfl: 3    blood-glucose meter kit, To check BG 4 times daily, to use with insurance preferred meter, Disp: 1 each, Rfl: 0    cetirizine (ZYRTEC) 1 mg/mL syrup, Take 5 mg by mouth., Disp: , Rfl:     DULoxetine (CYMBALTA) 30 MG capsule, Take 30 mg by mouth., Disp: , Rfl:     gabapentin (NEURONTIN) 300 MG capsule, Take 300 mg by mouth every evening., Disp: , Rfl:     HYDROcodone-acetaminophen (NORCO)  mg per tablet, Take 1 tablet by mouth 3 (three) times daily., Disp: , Rfl:     lancets Misc, To check BG 4 times daily, to use with insurance preferred meter, Disp: 100 each, Rfl: 3    linaCLOtide (LINZESS) 145 mcg Cap capsule, Take 1 capsule (145 mcg total) by mouth before breakfast. Take on an empty stomach 30 minutes prior to eating., Disp: 90 capsule, Rfl: 1    lisinopriL 10 MG tablet, Take 1 tablet (10 mg total) by mouth once daily., Disp: 90 tablet, Rfl: 3  [2]   Social History  Socioeconomic History    Marital status:    Tobacco Use    Smoking status: Never     Passive exposure: Never    Smokeless tobacco: Never   Substance and Sexual Activity    Alcohol use: Not Currently    Drug use: Never    Sexual activity: Never

## 2025-03-17 NOTE — PROCEDURES
Large Joint Aspiration/Injection: R knee    Date/Time: 3/17/2025 9:40 AM    Performed by: Alba Julien PA  Authorized by: Alba Julien PA    Consent Done?:  Yes (Verbal)  Indications:  Arthritis and pain  Site marked: the procedure site was marked      Details:  Needle Size:  22 G  Approach:  Anterolateral  Location:  Knee  Site:  R knee  Medications:  1 mL BUPivacaine (PF) 0.25% (2.5 mg/ml) 0.25 % (2.5 mg/mL); 40 mg triamcinolone acetonide 40 mg/mL  Patient tolerance:  Patient tolerated the procedure well with no immediate complications

## 2025-03-19 ENCOUNTER — TELEPHONE (OUTPATIENT)
Dept: FAMILY MEDICINE | Facility: CLINIC | Age: 50
End: 2025-03-19
Payer: MEDICARE

## 2025-04-23 ENCOUNTER — TELEPHONE (OUTPATIENT)
Dept: ORTHOPEDICS | Facility: CLINIC | Age: 50
End: 2025-04-23
Payer: MEDICARE

## 2025-05-05 ENCOUNTER — HOSPITAL ENCOUNTER (EMERGENCY)
Facility: HOSPITAL | Age: 50
Discharge: HOME | End: 2025-05-06
Payer: MEDICARE

## 2025-05-05 DIAGNOSIS — R06.02 SHORTNESS OF BREATH: ICD-10-CM

## 2025-05-05 DIAGNOSIS — K59.03 THERAPEUTIC OPIOID-INDUCED CONSTIPATION (OIC): ICD-10-CM

## 2025-05-05 DIAGNOSIS — R07.9 CHEST PAIN: ICD-10-CM

## 2025-05-05 DIAGNOSIS — T40.2X5A THERAPEUTIC OPIOID-INDUCED CONSTIPATION (OIC): ICD-10-CM

## 2025-05-05 DIAGNOSIS — R10.13 EPIGASTRIC PAIN: Primary | ICD-10-CM

## 2025-05-05 LAB
ALBUMIN SERPL BCP-MCNC: 3.9 G/DL (ref 3.5–5)
ALBUMIN/GLOB SERPL: 1.1 {RATIO}
ALP SERPL-CCNC: 87 U/L (ref 40–150)
ALT SERPL W P-5'-P-CCNC: 21 U/L
ANION GAP SERPL CALCULATED.3IONS-SCNC: 13 MMOL/L (ref 7–16)
APTT PPP: 31 SECONDS (ref 25.2–37.3)
AST SERPL W P-5'-P-CCNC: 24 U/L (ref 11–45)
BASOPHILS # BLD AUTO: 0.04 K/UL (ref 0–0.2)
BASOPHILS NFR BLD AUTO: 0.4 % (ref 0–1)
BILIRUB SERPL-MCNC: 0.5 MG/DL
BUN SERPL-MCNC: 17 MG/DL (ref 8–26)
BUN/CREAT SERPL: 17 (ref 6–20)
CALCIUM SERPL-MCNC: 9.1 MG/DL (ref 8.4–10.2)
CHLORIDE SERPL-SCNC: 104 MMOL/L (ref 98–107)
CO2 SERPL-SCNC: 23 MMOL/L (ref 22–29)
CREAT SERPL-MCNC: 1.03 MG/DL (ref 0.72–1.25)
D DIMER PPP FEU-MCNC: 0.46 ΜG/ML (ref 0–0.47)
DIFFERENTIAL METHOD BLD: ABNORMAL
EGFR (NO RACE VARIABLE) (RUSH/TITUS): 88 ML/MIN/1.73M2
EOSINOPHIL # BLD AUTO: 0.41 K/UL (ref 0–0.5)
EOSINOPHIL NFR BLD AUTO: 4.1 % (ref 1–4)
ERYTHROCYTE [DISTWIDTH] IN BLOOD BY AUTOMATED COUNT: 13.4 % (ref 11.5–14.5)
GLOBULIN SER-MCNC: 3.5 G/DL (ref 2–4)
GLUCOSE SERPL-MCNC: 120 MG/DL (ref 74–100)
HCT VFR BLD AUTO: 42.6 % (ref 40–54)
HGB BLD-MCNC: 15 G/DL (ref 13.5–18)
IMM GRANULOCYTES # BLD AUTO: 0.03 K/UL (ref 0–0.04)
IMM GRANULOCYTES NFR BLD: 0.3 % (ref 0–0.4)
INR BLD: 0.92
LIPASE SERPL-CCNC: 20 U/L
LYMPHOCYTES # BLD AUTO: 0.48 K/UL (ref 1–4.8)
LYMPHOCYTES NFR BLD AUTO: 4.8 % (ref 27–41)
MAGNESIUM SERPL-MCNC: 2 MG/DL (ref 1.6–2.6)
MCH RBC QN AUTO: 29.6 PG (ref 27–31)
MCHC RBC AUTO-ENTMCNC: 35.2 G/DL (ref 32–36)
MCV RBC AUTO: 84 FL (ref 80–96)
MONOCYTES # BLD AUTO: 0.91 K/UL (ref 0–0.8)
MONOCYTES NFR BLD AUTO: 9.1 % (ref 2–6)
MPC BLD CALC-MCNC: 9.1 FL (ref 9.4–12.4)
NEUTROPHILS # BLD AUTO: 8.18 K/UL (ref 1.8–7.7)
NEUTROPHILS NFR BLD AUTO: 81.3 % (ref 53–65)
NRBC # BLD AUTO: 0 X10E3/UL
NRBC, AUTO (.00): 0 %
NT-PROBNP SERPL-MCNC: 45 PG/ML (ref 1–125)
PLATELET # BLD AUTO: 193 K/UL (ref 150–400)
POTASSIUM SERPL-SCNC: 3.9 MMOL/L (ref 3.5–5.1)
PROT SERPL-MCNC: 7.4 G/DL (ref 6.4–8.3)
PROTHROMBIN TIME: 13 SECONDS (ref 11.7–14.7)
RBC # BLD AUTO: 5.07 M/UL (ref 4.6–6.2)
SODIUM SERPL-SCNC: 136 MMOL/L (ref 136–145)
TROPONIN I SERPL HS-MCNC: <2.7 NG/L
WBC # BLD AUTO: 10.05 K/UL (ref 4.5–11)

## 2025-05-05 PROCEDURE — 93005 ELECTROCARDIOGRAM TRACING: CPT

## 2025-05-05 PROCEDURE — 99284 EMERGENCY DEPT VISIT MOD MDM: CPT | Mod: GF | Performed by: NURSE PRACTITIONER

## 2025-05-05 PROCEDURE — 99285 EMERGENCY DEPT VISIT HI MDM: CPT | Mod: 25

## 2025-05-05 PROCEDURE — 25000003 PHARM REV CODE 250: Performed by: NURSE PRACTITIONER

## 2025-05-05 PROCEDURE — 83690 ASSAY OF LIPASE: CPT | Performed by: NURSE PRACTITIONER

## 2025-05-05 PROCEDURE — 96374 THER/PROPH/DIAG INJ IV PUSH: CPT | Mod: 59

## 2025-05-05 PROCEDURE — 85610 PROTHROMBIN TIME: CPT | Performed by: NURSE PRACTITIONER

## 2025-05-05 PROCEDURE — 93010 ELECTROCARDIOGRAM REPORT: CPT | Performed by: HOSPITALIST

## 2025-05-05 PROCEDURE — 80053 COMPREHEN METABOLIC PANEL: CPT | Performed by: NURSE PRACTITIONER

## 2025-05-05 PROCEDURE — 94761 N-INVAS EAR/PLS OXIMETRY MLT: CPT

## 2025-05-05 PROCEDURE — 85025 COMPLETE CBC W/AUTO DIFF WBC: CPT | Performed by: NURSE PRACTITIONER

## 2025-05-05 PROCEDURE — 27000221 HC OXYGEN, UP TO 24 HOURS

## 2025-05-05 PROCEDURE — 63600175 PHARM REV CODE 636 W HCPCS: Performed by: NURSE PRACTITIONER

## 2025-05-05 PROCEDURE — 85730 THROMBOPLASTIN TIME PARTIAL: CPT | Performed by: NURSE PRACTITIONER

## 2025-05-05 PROCEDURE — 84484 ASSAY OF TROPONIN QUANT: CPT | Performed by: NURSE PRACTITIONER

## 2025-05-05 PROCEDURE — 83880 ASSAY OF NATRIURETIC PEPTIDE: CPT | Performed by: NURSE PRACTITIONER

## 2025-05-05 PROCEDURE — 83735 ASSAY OF MAGNESIUM: CPT | Performed by: NURSE PRACTITIONER

## 2025-05-05 PROCEDURE — 85379 FIBRIN DEGRADATION QUANT: CPT | Performed by: NURSE PRACTITIONER

## 2025-05-05 RX ORDER — ONDANSETRON HYDROCHLORIDE 2 MG/ML
4 INJECTION, SOLUTION INTRAVENOUS
Status: COMPLETED | OUTPATIENT
Start: 2025-05-05 | End: 2025-05-05

## 2025-05-05 RX ORDER — NAPROXEN SODIUM 220 MG/1
324 TABLET, FILM COATED ORAL
Status: COMPLETED | OUTPATIENT
Start: 2025-05-05 | End: 2025-05-05

## 2025-05-05 RX ADMIN — ONDANSETRON 4 MG: 2 INJECTION INTRAMUSCULAR; INTRAVENOUS at 11:05

## 2025-05-05 RX ADMIN — ASPIRIN 324 MG: 81 TABLET, CHEWABLE ORAL at 11:05

## 2025-05-05 NOTE — Clinical Note
"Ubaldo Abrams" Main was seen and treated in our emergency department on 5/5/2025.  He may return to work on 05/07/2025.       If you have any questions or concerns, please don't hesitate to call.      Zulma Killian NP"

## 2025-05-06 VITALS
WEIGHT: 315 LBS | HEART RATE: 68 BPM | BODY MASS INDEX: 46.65 KG/M2 | DIASTOLIC BLOOD PRESSURE: 62 MMHG | RESPIRATION RATE: 18 BRPM | SYSTOLIC BLOOD PRESSURE: 110 MMHG | HEIGHT: 69 IN | OXYGEN SATURATION: 97 % | TEMPERATURE: 98 F

## 2025-05-06 LAB
OHS QRS DURATION: 94 MS
OHS QTC CALCULATION: 426 MS
TROPONIN I SERPL HS-MCNC: <2.7 NG/L

## 2025-05-06 PROCEDURE — 25500020 PHARM REV CODE 255: Performed by: NURSE PRACTITIONER

## 2025-05-06 PROCEDURE — 84484 ASSAY OF TROPONIN QUANT: CPT | Performed by: NURSE PRACTITIONER

## 2025-05-06 PROCEDURE — 36415 COLL VENOUS BLD VENIPUNCTURE: CPT | Performed by: NURSE PRACTITIONER

## 2025-05-06 PROCEDURE — 96375 TX/PRO/DX INJ NEW DRUG ADDON: CPT

## 2025-05-06 PROCEDURE — 63600175 PHARM REV CODE 636 W HCPCS: Performed by: NURSE PRACTITIONER

## 2025-05-06 RX ORDER — PANTOPRAZOLE SODIUM 40 MG/10ML
40 INJECTION, POWDER, LYOPHILIZED, FOR SOLUTION INTRAVENOUS
Status: COMPLETED | OUTPATIENT
Start: 2025-05-06 | End: 2025-05-06

## 2025-05-06 RX ORDER — IOPAMIDOL 755 MG/ML
100 INJECTION, SOLUTION INTRAVASCULAR
Status: COMPLETED | OUTPATIENT
Start: 2025-05-06 | End: 2025-05-06

## 2025-05-06 RX ORDER — PANTOPRAZOLE SODIUM 40 MG/1
40 TABLET, DELAYED RELEASE ORAL DAILY
Qty: 30 TABLET | Refills: 0 | Status: SHIPPED | OUTPATIENT
Start: 2025-05-06 | End: 2025-06-05

## 2025-05-06 RX ADMIN — PANTOPRAZOLE SODIUM 40 MG: 40 INJECTION, POWDER, LYOPHILIZED, FOR SOLUTION INTRAVENOUS at 12:05

## 2025-05-06 RX ADMIN — IOPAMIDOL 100 ML: 755 INJECTION, SOLUTION INTRAVENOUS at 12:05

## 2025-05-06 NOTE — ED PROVIDER NOTES
"Encounter Date: 5/5/2025       History     Chief Complaint   Patient presents with    Chest Pain    Shortness of Breath     Presented with c/o epigastric pain radiating to chest, he describes "tightness" and SOB that started at about 1700 today while lying down but worsens with exertion. Reports that he noted some bloating and swelling in his abd 3 days ago that worsened yesterday. Reports that he didn't feel like doing anything yesterday due to the bloating feeling. Reports some nausea today with vomiting x 1. Denies diaphoresis. Reports last BM was 5/2. States usually has BM daily. Denies fever or chills. Reports has had similar episode of pain about 2 years ago. Notes was evaluated by cardiology and had neg stress test then. Reports family history of CAD of both parents. Denies personal history of tobacco, ETOH or drug use.      Review of patient's allergies indicates:   Allergen Reactions    Morphine Itching    Opioids - morphine analogues Hives and Rash     Only morphine     Past Medical History:   Diagnosis Date    Asthma     Diabetes mellitus, type 2     Hypertension      Past Surgical History:   Procedure Laterality Date    NECK SURGERY       No family history on file.  Social History[1]  Review of Systems   Constitutional:  Positive for activity change, appetite change and fatigue. Negative for fever.   HENT:  Negative for congestion.    Respiratory:  Positive for shortness of breath. Negative for cough.    Cardiovascular:  Positive for chest pain. Negative for palpitations and leg swelling.   Gastrointestinal:  Positive for abdominal pain, nausea and vomiting. Negative for constipation.   Genitourinary:  Negative for difficulty urinating.   Musculoskeletal:  Negative for arthralgias.   Skin:  Negative for color change.   Neurological:  Negative for dizziness, weakness and light-headedness.   Psychiatric/Behavioral: Negative.         Physical Exam     Initial Vitals [05/05/25 2246]   BP Pulse Resp Temp SpO2 "   125/67 80 (!) 22 97.9 °F (36.6 °C) (!) 94 %      MAP       --         Physical Exam    Nursing note and vitals reviewed.  Constitutional: He appears well-developed and well-nourished. No distress.   Morbidly obese   HENT:   Head: Normocephalic.   Right Ear: External ear normal.   Left Ear: External ear normal.   Nose: Nose normal. Mouth/Throat: Oropharynx is clear and moist.   Eyes: Conjunctivae and EOM are normal.   Neck: Neck supple. No JVD present.   Normal range of motion.  Cardiovascular:  Normal rate, regular rhythm, normal heart sounds and intact distal pulses.           No murmur heard.  Pulmonary/Chest: Breath sounds normal. He has no wheezes. He has no rhonchi. He has no rales. He exhibits no tenderness.     Abdominal: Abdomen is soft. Bowel sounds are normal. There is abdominal tenderness (generalized with worse in epigastric area).   Musculoskeletal:         General: No tenderness or edema. Normal range of motion.      Cervical back: Normal range of motion and neck supple.     Neurological: He is alert and oriented to person, place, and time. He has normal strength. GCS score is 15. GCS eye subscore is 4. GCS verbal subscore is 5. GCS motor subscore is 6.   Skin: Skin is warm and dry. Capillary refill takes less than 2 seconds.   Psychiatric: He has a normal mood and affect.         Medical Screening Exam   See Full Note    ED Course   Procedures  Labs Reviewed   COMPREHENSIVE METABOLIC PANEL - Abnormal       Result Value    Sodium 136      Potassium 3.9      Chloride 104      CO2 23      Anion Gap 13      Glucose 120 (*)     BUN 17      Creatinine 1.03      BUN/Creatinine Ratio 17      Calcium 9.1      Total Protein 7.4      Albumin 3.9      Globulin 3.5      A/G Ratio 1.1      Bilirubin, Total 0.5      Alk Phos 87      ALT 21      AST 24      eGFR 88     CBC WITH DIFFERENTIAL - Abnormal    WBC 10.05      RBC 5.07      Hemoglobin 15.0      Hematocrit 42.6      MCV 84.0      MCH 29.6      MCHC 35.2       RDW 13.4      Platelet Count 193      MPV 9.1 (*)     Neutrophils % 81.3 (*)     Lymphocytes % 4.8 (*)     Monocytes % 9.1 (*)     Eosinophils % 4.1 (*)     Basophils % 0.4      Immature Granulocytes % 0.3      nRBC, Auto 0.0      Neutrophils, Abs 8.18 (*)     Lymphocytes, Absolute 0.48 (*)     Monocytes, Absolute 0.91 (*)     Eosinophils, Absolute 0.41      Basophils, Absolute 0.04      Immature Granulocytes, Absolute 0.03      nRBC, Absolute 0.00      Diff Type Auto     PROTIME-INR - Normal    PT 13.0      INR 0.92     APTT - Normal    PTT 31.0     TROPONIN I - Normal    Troponin I High Sensitivity <2.7     NT-PRO NATRIURETIC PEPTIDE - Normal    ProBNP 45     LIPASE - Normal    Lipase 20     MAGNESIUM - Normal    Magnesium 2.0     D DIMER, QUANTITATIVE - Normal    D-Dimer 0.46     TROPONIN I - Normal    Troponin I High Sensitivity <2.7     CBC W/ AUTO DIFFERENTIAL    Narrative:     The following orders were created for panel order CBC auto differential.  Procedure                               Abnormality         Status                     ---------                               -----------         ------                     CBC with Differential[[id number]]       Abnormal            Final result                 Please view results for these tests on the individual orders.     EKG Readings: (Independently Interpreted)   Initial Reading: No STEMI. Previous EKG: Compared with most recent EKG Previous EKG Date: 7/8/22. Rhythm: Normal Sinus Rhythm. Heart Rate: 79.   Reviewed at 2245.       Imaging Results              CT Abdomen Pelvis With IV Contrast NO Oral Contrast (In process)                      X-Ray Chest 1 View (In process)                   X-Rays:   Independently Interpreted Readings:   Chest X-Ray: No acute pulmonary disease. The heart is not enlarged. Post op changes of the spine.   Abdomen: Gall bladder is distended. No stones seen. Hepatomegaly. Stomach is distended with food, air and fluid.  "Appendix is not visualized.     Medications   aspirin chewable tablet 324 mg (324 mg Oral Given 5/5/25 2303)   ondansetron injection 4 mg (4 mg Intravenous Given 5/5/25 2356)   iopamidoL (ISOVUE-370) injection 100 mL (100 mLs Intravenous Given 5/6/25 0018)   pantoprazole injection 40 mg (40 mg Intravenous Given 5/6/25 0036)     Medical Decision Making  Presented with c/o epigastric pain radiating to chest, he describes "tightness" and SOB that started at about 1700 today while lying down but worsens with exertion. Reports that he noted some bloating and swelling in his abd 3 days ago that worsened yesterday. Reports that he didn't feel like doing anything yesterday due to the bloating feeling. Reports some nausea today with vomiting x 1. Denies diaphoresis. Reports last BM was 5/2. States usually has BM daily. Denies fever or chills. Reports has had similar episode of pain about 2 years ago. Notes was evaluated by cardiology and had neg stress test then. Reports family history of CAD of both parents. Denies personal history of tobacco, ETOH or drug use.    Amount and/or Complexity of Data Reviewed  External Data Reviewed: labs, radiology, ECG and notes.     Details: GB US neg on 3/4/25. EGD 2/26/25 showed "mild chronic gastritis. No intestinal metaplasia, dysplasia, or malignancy. No H. pylori organisms identified on H and E or IHC     Labs: ordered. Decision-making details documented in ED Course.     Details: Troponin #1 < 2.7, WBC 15859 with H&H 15 and 42.6, plt 283190, d dimer 0.46, PTT 31, INR 0.92, Na 136, K+ 3.9, BUN 17, creatinine 1.03, ALT 21, AST 24, BNP 45, Mg 2.0. Repeat troponin < 2.7.  Radiology: ordered.     Details: CXR shows no acute cardiopulmonary abnormality. CT abd pelvis shows distended gallbladder with no noted calcified stones.     Risk  OTC drugs.  Prescription drug management.  Risk Details:  mg po given. Ondansetron 4 mg IVP. Protonix 40mg IVP. Pt reports that pain and nausea has " subsided. No vomiting while in the ED. Discussed assessment and diagnostic findings with pt. He reports that his last BM was 5/2 and is out of his Linzess. Also, noted history of gastritis in EMR. Pt reports that he does not take PPI or H2B at present. He does report that he ate a cheeseburger prior to the onset of his symptoms of bloating. He also notes that he has been having similar episodes for the last 6 years. And has been evaluated as noted above. Rx for Linzess, Protonix. Hr 66, RR 17, /50, O2 sat 94% on RA. Lungs clear bilat.  Instructed on home care, diet, med use, follow-up and return precautions. Discharged home with detailed written instructions provided.                 ED Course as of 05/06/25 0137   Mon May 05, 2025   2314 WBC: 10.05 [DS]   2314 Hemoglobin: 15.0 [DS]   2314 Hematocrit: 42.6 [DS]   2314 Platelet Count: 193 [DS]   2329 D-Dimer: 0.46 [DS]   2330 PTT: 31.0 [DS]   2330 INR: 0.92 [DS]   2333 Troponin I High Sensitivity: <2.7 [DS]   2334 Lipase: 20 [DS]   2334 Sodium: 136 [DS]   2334 Potassium: 3.9 [DS]   2334 Glucose(!): 120 [DS]   2334 BUN: 17 [DS]   2334 Creatinine: 1.03 [DS]   2334 ALT: 21 [DS]   2334 AST: 24 [DS]   2334 NT-proBNP: 45 [DS]   2334 Magnesium : 2.0 [DS]   Tue May 06, 2025   0030 Nausea improved. Notes continued tightness and burning in epigastric area. Protonix IVP ordered. [DS]   0112 Pt reports epigastric and chest pain have subsided as well as SOB. Reports that he still feel bloated. Discussed assessment and diagnostic findings with pt. 2nd troponin pending. [DS]      ED Course User Index  [DS] Zumla Killian NP                           Clinical Impression:   Final diagnoses:  [R07.9] Chest pain  [R06.02] Shortness of breath  [R10.13] Epigastric pain (Primary)        ED Disposition Condition    Discharge Stable          ED Prescriptions       Medication Sig Dispense Start Date End Date Auth. Provider    linaCLOtide (LINZESS) 145 mcg Cap capsule  (Status:  Discontinued) Take 1 capsule (145 mcg total) by mouth before breakfast. Take on an empty stomach 30 minutes prior to eating. for 30 doses 30 capsule 5/6/2025 5/6/2025 Zulma Killian NP    pantoprazole (PROTONIX) 40 MG tablet Take 1 tablet (40 mg total) by mouth once daily. 30 tablet 5/6/2025 6/5/2025 Zulma Killian NP    linaCLOtide (LINZESS) 145 mcg Cap capsule Take 1 capsule (145 mcg total) by mouth before breakfast. Take on an empty stomach 30 minutes prior to eating. for 30 doses 30 capsule 5/6/2025 6/5/2025 Zluma Killian NP          Follow-up Information       Follow up With Specialties Details Why Contact Info    Daniele Isaac FNP-C Family Medicine In 1 day  [address] [facility] [address]  [phone]      [facility] - Emergency Department Emergency Medicine  If symptoms worsen or new symptoms appear [address] [place] [id number] [phone]               [1]   Social History  Tobacco Use    Smoking status: Never     Passive exposure: Never    Smokeless tobacco: Never   Substance Use Topics    Alcohol use: Not Currently    Drug use: Never        [place], Zulma, NP  05/06/25 0137

## 2025-05-06 NOTE — ED TRIAGE NOTES
Presents to ER with c/o having chest pain and shortness of breath. States woke up Saturday morning and started swelling in his abdomen and he said the swelling has gotten worse. Has increasing shortness of breath. Started having chest pain and shortness of breath today about 5 pm today. States he feels like he giving out of air.

## 2025-05-06 NOTE — DISCHARGE INSTRUCTIONS
As discussed, you have no signs of infection to your abdomen at this point; however, it is possible that it is early in the disease process and infection is not evident yet. If your symptoms worsen with fever, vomiting or worsening pain, return to the ED immediately for re-evaluation. If you experience chest pain or trouble breathing as well, return to the ED for re-evaluation.    Take Linzess as needed for constipation. Take pantoprazole daily as prescribed. Drink plenty of liquids. Maintain bland diet for the next 2-3 days. Try to stay away from greasy, fatty or fried foods to see if your symptoms resolve. Follow-up with your PCP in 1-2 days.

## 2025-09-02 ENCOUNTER — HOSPITAL ENCOUNTER (OUTPATIENT)
Dept: RADIOLOGY | Facility: HOSPITAL | Age: 50
Discharge: HOME | End: 2025-09-02
Payer: MEDICARE

## 2025-09-02 ENCOUNTER — OFFICE VISIT (OUTPATIENT)
Dept: ORTHOPEDICS | Facility: CLINIC | Age: 50
End: 2025-09-02
Payer: MEDICARE

## 2025-09-02 VITALS
SYSTOLIC BLOOD PRESSURE: 156 MMHG | WEIGHT: 315 LBS | OXYGEN SATURATION: 95 % | BODY MASS INDEX: 46.65 KG/M2 | DIASTOLIC BLOOD PRESSURE: 66 MMHG | HEIGHT: 69 IN | HEART RATE: 69 BPM

## 2025-09-02 DIAGNOSIS — G89.29 CHRONIC PAIN OF RIGHT KNEE: ICD-10-CM

## 2025-09-02 DIAGNOSIS — M25.562 LEFT KNEE PAIN, UNSPECIFIED CHRONICITY: ICD-10-CM

## 2025-09-02 DIAGNOSIS — G89.29 CHRONIC PAIN OF RIGHT KNEE: Primary | ICD-10-CM

## 2025-09-02 DIAGNOSIS — M25.561 CHRONIC PAIN OF RIGHT KNEE: Primary | ICD-10-CM

## 2025-09-02 DIAGNOSIS — M25.561 CHRONIC PAIN OF RIGHT KNEE: ICD-10-CM

## 2025-09-02 PROCEDURE — 73562 X-RAY EXAM OF KNEE 3: CPT | Mod: TC,RT

## 2025-09-02 PROCEDURE — 20610 DRAIN/INJ JOINT/BURSA W/O US: CPT | Mod: PBBFAC,RT

## 2025-09-02 PROCEDURE — 99214 OFFICE O/P EST MOD 30 MIN: CPT | Mod: PBBFAC,25

## 2025-09-02 PROCEDURE — 73562 X-RAY EXAM OF KNEE 3: CPT | Mod: TC,LT

## 2025-09-02 PROCEDURE — 99999PBSHW PR PBB SHADOW TECHNICAL ONLY FILED TO HB: Mod: PBBFAC,,,

## 2025-09-02 PROCEDURE — 73562 X-RAY EXAM OF KNEE 3: CPT | Mod: 26,RT,, | Performed by: RADIOLOGY

## 2025-09-02 PROCEDURE — 99999 PR PBB SHADOW E&M-EST. PATIENT-LVL IV: CPT | Mod: PBBFAC,,,

## 2025-09-02 RX ADMIN — BUPIVACAINE HYDROCHLORIDE 1 ML: 2.5 INJECTION, SOLUTION EPIDURAL; INFILTRATION; INTRACAUDAL; PERINEURAL at 01:09

## 2025-09-02 RX ADMIN — TRIAMCINOLONE ACETONIDE 40 MG: 40 INJECTION, SUSPENSION INTRA-ARTICULAR; INTRAMUSCULAR at 01:09

## 2025-09-05 RX ORDER — BUPIVACAINE HYDROCHLORIDE 2.5 MG/ML
1 INJECTION, SOLUTION EPIDURAL; INFILTRATION; INTRACAUDAL; PERINEURAL
Status: DISCONTINUED | OUTPATIENT
Start: 2025-09-02 | End: 2025-09-05 | Stop reason: HOSPADM

## 2025-09-05 RX ORDER — TRIAMCINOLONE ACETONIDE 40 MG/ML
40 INJECTION, SUSPENSION INTRA-ARTICULAR; INTRAMUSCULAR
Status: DISCONTINUED | OUTPATIENT
Start: 2025-09-02 | End: 2025-09-05 | Stop reason: HOSPADM